# Patient Record
Sex: FEMALE | Race: WHITE | Employment: PART TIME | URBAN - METROPOLITAN AREA
[De-identification: names, ages, dates, MRNs, and addresses within clinical notes are randomized per-mention and may not be internally consistent; named-entity substitution may affect disease eponyms.]

---

## 2023-04-24 ENCOUNTER — HOSPITAL ENCOUNTER (OUTPATIENT)
Dept: ULTRASOUND IMAGING | Age: 48
Discharge: HOME | End: 2023-04-27
Payer: COMMERCIAL

## 2023-04-24 DIAGNOSIS — N92.0 EXCESSIVE AND FREQUENT MENSTRUATION WITH REGULAR CYCLE: ICD-10-CM

## 2023-04-24 PROCEDURE — 76856 US EXAM PELVIC COMPLETE: CPT

## 2024-05-13 ENCOUNTER — OFFICE VISIT (OUTPATIENT)
Dept: OBGYN CLINIC | Age: 49
End: 2024-05-13
Payer: COMMERCIAL

## 2024-05-13 VITALS
SYSTOLIC BLOOD PRESSURE: 142 MMHG | DIASTOLIC BLOOD PRESSURE: 80 MMHG | WEIGHT: 197.8 LBS | HEIGHT: 62 IN | BODY MASS INDEX: 36.4 KG/M2

## 2024-05-13 DIAGNOSIS — Z01.419 WELL WOMAN EXAM: Primary | ICD-10-CM

## 2024-05-13 DIAGNOSIS — N92.4 EXCESSIVE BLEEDING IN PREMENOPAUSAL PERIOD: ICD-10-CM

## 2024-05-13 PROCEDURE — 99214 OFFICE O/P EST MOD 30 MIN: CPT | Performed by: OBSTETRICS & GYNECOLOGY

## 2024-05-13 PROCEDURE — 99396 PREV VISIT EST AGE 40-64: CPT | Performed by: OBSTETRICS & GYNECOLOGY

## 2024-05-13 RX ORDER — FERROUS SULFATE 300 MG/5ML
300 LIQUID (ML) ORAL DAILY
COMMUNITY

## 2024-05-13 RX ORDER — TRANEXAMIC ACID 650 MG/1
1300 TABLET ORAL 3 TIMES DAILY
Qty: 30 TABLET | Refills: 3 | Status: SHIPPED | OUTPATIENT
Start: 2024-05-13

## 2024-05-13 SDOH — ECONOMIC STABILITY: HOUSING INSECURITY
IN THE LAST 12 MONTHS, WAS THERE A TIME WHEN YOU DID NOT HAVE A STEADY PLACE TO SLEEP OR SLEPT IN A SHELTER (INCLUDING NOW)?: NO

## 2024-05-13 SDOH — ECONOMIC STABILITY: FOOD INSECURITY: WITHIN THE PAST 12 MONTHS, YOU WORRIED THAT YOUR FOOD WOULD RUN OUT BEFORE YOU GOT MONEY TO BUY MORE.: NEVER TRUE

## 2024-05-13 SDOH — ECONOMIC STABILITY: FOOD INSECURITY: WITHIN THE PAST 12 MONTHS, THE FOOD YOU BOUGHT JUST DIDN'T LAST AND YOU DIDN'T HAVE MONEY TO GET MORE.: NEVER TRUE

## 2024-05-13 SDOH — ECONOMIC STABILITY: INCOME INSECURITY: HOW HARD IS IT FOR YOU TO PAY FOR THE VERY BASICS LIKE FOOD, HOUSING, MEDICAL CARE, AND HEATING?: PATIENT DECLINED

## 2024-05-13 ASSESSMENT — PATIENT HEALTH QUESTIONNAIRE - PHQ9
SUM OF ALL RESPONSES TO PHQ QUESTIONS 1-9: 0
SUM OF ALL RESPONSES TO PHQ QUESTIONS 1-9: 0
SUM OF ALL RESPONSES TO PHQ9 QUESTIONS 1 & 2: 0
SUM OF ALL RESPONSES TO PHQ QUESTIONS 1-9: 0
1. LITTLE INTEREST OR PLEASURE IN DOING THINGS: NOT AT ALL
SUM OF ALL RESPONSES TO PHQ QUESTIONS 1-9: 0
2. FEELING DOWN, DEPRESSED OR HOPELESS: NOT AT ALL

## 2024-05-13 NOTE — PROGRESS NOTES
The patient is here for  problems including hypermen     HISTORY:      Patient's last menstrual period was 2024 (exact date).SEE BELOW      Current Outpatient Medications on File Prior to Visit   Medication Sig Dispense Refill    ferrous Sulfate 300 (60 Fe) MG/5ML solution Take 5 mLs by mouth daily       No current facility-administered medications on file prior to visit.   SEE LIST    ROS:      PHYSICAL EXAM:  Blood pressure (!) 142/80, height 1.562 m (5' 1.5\"), weight 89.7 kg (197 lb 12.8 oz), last menstrual period 2024.    The patient appears well, alert, oriented x 3, in no distress.  Lungs are clear. Heart RRR, no murmurs. Abdomen soft without tenderness, guarding, mass or organomegaly.  Pelvic: bg     ASSESSMENT:DIAGNOSIS DISCUSSED INCLUDING DIFFERENTIAL cs x4  reg cy last hgb 8\" no efra  pap 12m ago bg no hx abn pap  col   us 12m ago 12cm today ? 10weeks ? Terry p asking about ablation  offered all  Lysteda     PLAN:    No orders of the defined types were placed in this encounter.    Complex high risk decision making many questions answered history reviewed precharting done required 30 minute of time discussing a vaiety of problems  goals are set  follow up planned     Trav  is a 48 y.o.   who is here for an annual exam.      History  Past Medical History:   Diagnosis Date    Anemia     Menorrhagia     ON FILE  Past Surgical History:   Procedure Laterality Date     SECTION      x4    PRESENT IN FILE  Current Outpatient Medications on File Prior to Visit   Medication Sig Dispense Refill    ferrous Sulfate 300 (60 Fe) MG/5ML solution Take 5 mLs by mouth daily       No current facility-administered medications on file prior to visit.   SEE UPDATED LIST  No Known AllergiesSEE LIST  Social History     Tobacco Use    Smoking status: Never    Smokeless tobacco: Never   Substance Use Topics    Alcohol use: Not Currently      History reviewed. No pertinent family history.  OBGYN History:

## 2024-06-10 ENCOUNTER — OFFICE VISIT (OUTPATIENT)
Dept: OBGYN CLINIC | Age: 49
End: 2024-06-10
Payer: COMMERCIAL

## 2024-06-10 ENCOUNTER — PROCEDURE VISIT (OUTPATIENT)
Dept: OBGYN CLINIC | Age: 49
End: 2024-06-10
Payer: COMMERCIAL

## 2024-06-10 VITALS
SYSTOLIC BLOOD PRESSURE: 142 MMHG | HEIGHT: 62 IN | BODY MASS INDEX: 36.62 KG/M2 | WEIGHT: 199 LBS | DIASTOLIC BLOOD PRESSURE: 78 MMHG

## 2024-06-10 DIAGNOSIS — N92.4 EXCESSIVE BLEEDING IN PREMENOPAUSAL PERIOD: Primary | ICD-10-CM

## 2024-06-10 DIAGNOSIS — D64.9 HEMOGLOBIN LOW: ICD-10-CM

## 2024-06-10 DIAGNOSIS — N85.2 ENLARGED UTERUS: Primary | ICD-10-CM

## 2024-06-10 DIAGNOSIS — N83.202 CYSTS OF BOTH OVARIES: ICD-10-CM

## 2024-06-10 DIAGNOSIS — N83.201 CYSTS OF BOTH OVARIES: ICD-10-CM

## 2024-06-10 PROCEDURE — 76830 TRANSVAGINAL US NON-OB: CPT | Performed by: OBSTETRICS & GYNECOLOGY

## 2024-06-10 PROCEDURE — 99215 OFFICE O/P EST HI 40 MIN: CPT | Performed by: OBSTETRICS & GYNECOLOGY

## 2024-06-10 NOTE — PROGRESS NOTES
The patient is here for  problems including enlarged melyssa     HISTORY:      Patient's last menstrual period was 04/20/2024 (exact date).SEE BELOW      Current Outpatient Medications on File Prior to Visit   Medication Sig Dispense Refill    ferrous Sulfate 300 (60 Fe) MG/5ML solution Take 5 mLs by mouth daily      tranexamic acid (LYSTEDA) 650 MG TABS tablet Take 2 tablets by mouth 3 times daily 30 tablet 3     No current facility-administered medications on file prior to visit.   SEE LIST    ROS:      PHYSICAL EXAM:  Blood pressure (!) 142/78, height 1.562 m (5' 1.5\"), weight 90.3 kg (199 lb), last menstrual period 04/20/2024.    The patient appears well, alert, oriented x 3, in no distress.  Lungs are clear. Heart RRR, no murmurs. Abdomen soft without tenderness, guarding, mass or organomegaly.  Pelvic: bg     ASSESSMENT:DIAGNOSIS DISCUSSED INCLUDING DIFFERENTIAL   Last hgb 8  Lysteda/fe/  decl endo bx today see us ::180 decl ablation or other wants hyst when hgb better   PLAN:    No orders of the defined types were placed in this encounter.    Complex high risk decision making many questions answered history reviewed precharting done required 40 minute of time discussing a vaiety of problems  goals are set  follow up planned

## 2024-06-25 ENCOUNTER — OFFICE VISIT (OUTPATIENT)
Dept: OBGYN CLINIC | Age: 49
End: 2024-06-25
Payer: COMMERCIAL

## 2024-06-25 VITALS
BODY MASS INDEX: 35.26 KG/M2 | SYSTOLIC BLOOD PRESSURE: 142 MMHG | DIASTOLIC BLOOD PRESSURE: 78 MMHG | WEIGHT: 199 LBS | HEIGHT: 63 IN

## 2024-06-25 DIAGNOSIS — D50.8 OTHER IRON DEFICIENCY ANEMIA: Primary | ICD-10-CM

## 2024-06-25 PROCEDURE — 99214 OFFICE O/P EST MOD 30 MIN: CPT | Performed by: OBSTETRICS & GYNECOLOGY

## 2024-06-25 NOTE — PROGRESS NOTES
patient.  On this date I have spent 40 minutes reviewing previous notes, test results, and face to face with the patient discussing the diagnosis and importance of compliance with the treatment plan as well as documenting.         ASSESSMENT/PLAN:   48 y.o., , with HMB, anemia:   - Discussed R/B/A to surgery. Due to her hx of CS x 4 she is a increased risk of pelvic adhesions/bladder adhesions and bladder injury during hysterectomy.   - Patient would like to try cyclic Aygestin while trying IV iron. If this improves bleeding may defer hysterectomy  - Plan to bring back in 3 months to recheck. If no improvement plan for TLH/BS  - In the meantime will refer to hematology for IV iron to optimize incase surgery  - Start Aygestin x 30 days due to prolong bleed. Then cyclic to maintain regular, light cycle    Orders Placed This Encounter   Procedures    BS - Arcelia Hernandez MD, Hematology & Oncology, [place]     Referral Priority:   Routine     Referral Type:   Eval and Treat     Referral Reason:   Specialty Services Required     Referred to Provider:   Arcelia Hernandez MD     Requested Specialty:   Pediatric Hematology/Oncology     Number of Visits Requested:   1        Lorena Mcrae DO

## 2024-07-02 NOTE — PROGRESS NOTES
NEW PATIENT INTAKE    Referral Diagnosis: Other iron deficiency anemia    Referring Provider:  Lorena Mcrae DO    Primary Care Provider: Rona Rossi, APRN - NP    Family History of Cancer/ Hematology Disorders: None reported     Presenting Symptoms:  Menorrhagia     Chronological History of Pertinent Events:  Ms. Lundy is a 48-year-old white female referred for NOEL.     5/13/24: Annual GYN exam with Dr. Edwar Orosco - reporting menorrhagia with last HGB reportedly 8  -Taking 300 mg liquid iron daily  -Started on Lysteda     6/10/24: TVUS: Enlarged, heterogenous UT (tilts posteriorly). Endometrium = 0.0mm. Irregular and avascular. Small, multiple Nabothian cysts in the cx. RT OV- seen TA only. Simple cyst. 4.7 x 3.1 x 4.5 cm. LT OV- seen TA only. Simple cyst. 1.4 x 0.9 x 1.5cm. No pelvic FF (Epic/Imaging)    6/25/24: Surgery consult with Dr. Mcrae at [facility] for HMB, anemia   -Pt reports regular, heavy menses until last cycle which has been prolonged >20 days  -Side effects to hormonal treatment in the past using OCPs  -MD notes, “Patient would like to try cyclic Aygestin while trying IV iron. If this improves bleeding may defer hysterectomy. Plan to bring back in 3 months to recheck. If no improvement plan for TLH/BS. In the meantime will refer to hematology for IV iron to optimize incase surgery. Start Aygestin x 30 days due to prolong bleed. Then cyclic to maintain regular, light cycle”  -Referred to [facility]    Notes from Referring Provider: None    Presented at Tumor Board: N/A    Other Pertinent Information: None

## 2024-07-03 ENCOUNTER — OFFICE VISIT (OUTPATIENT)
Dept: ONCOLOGY | Age: 49
End: 2024-07-03
Payer: COMMERCIAL

## 2024-07-03 ENCOUNTER — HOSPITAL ENCOUNTER (OUTPATIENT)
Dept: LAB | Age: 49
Discharge: HOME | End: 2024-07-06
Payer: COMMERCIAL

## 2024-07-03 VITALS
HEART RATE: 72 BPM | TEMPERATURE: 98.5 F | DIASTOLIC BLOOD PRESSURE: 71 MMHG | WEIGHT: 201 LBS | OXYGEN SATURATION: 100 % | RESPIRATION RATE: 18 BRPM | SYSTOLIC BLOOD PRESSURE: 116 MMHG | HEIGHT: 62 IN | BODY MASS INDEX: 36.99 KG/M2

## 2024-07-03 DIAGNOSIS — D50.0 IRON DEFICIENCY ANEMIA DUE TO CHRONIC BLOOD LOSS: Primary | ICD-10-CM

## 2024-07-03 DIAGNOSIS — E55.9 VITAMIN D DEFICIENCY: ICD-10-CM

## 2024-07-03 DIAGNOSIS — D64.9 ANEMIA, UNSPECIFIED TYPE: ICD-10-CM

## 2024-07-03 LAB
25(OH)D3 SERPL-MCNC: 32.9 NG/ML (ref 30–100)
ABO + RH BLD: NORMAL
ALBUMIN SERPL-MCNC: 3.8 G/DL (ref 3.5–5)
ALBUMIN/GLOB SERPL: 1.3 (ref 1–1.9)
ALP SERPL-CCNC: 56 U/L (ref 35–104)
ALT SERPL-CCNC: 16 U/L (ref 12–65)
ANION GAP SERPL CALC-SCNC: 10 MMOL/L (ref 9–18)
AST SERPL-CCNC: 19 U/L (ref 15–37)
BASOPHILS # BLD: 0 K/UL (ref 0–0.2)
BASOPHILS NFR BLD: 0 % (ref 0–2)
BILIRUB SERPL-MCNC: <0.2 MG/DL (ref 0–1.2)
BLOOD GROUP ANTIBODIES SERPL: NORMAL
BUN SERPL-MCNC: 8 MG/DL (ref 6–23)
CALCIUM SERPL-MCNC: 9.4 MG/DL (ref 8.8–10.2)
CHLORIDE SERPL-SCNC: 107 MMOL/L (ref 98–107)
CO2 SERPL-SCNC: 26 MMOL/L (ref 20–28)
CREAT SERPL-MCNC: 0.62 MG/DL (ref 0.6–1.1)
DIFFERENTIAL METHOD BLD: ABNORMAL
EOSINOPHIL # BLD: 0.1 K/UL (ref 0–0.8)
EOSINOPHIL NFR BLD: 1 % (ref 0.5–7.8)
ERYTHROCYTE [DISTWIDTH] IN BLOOD BY AUTOMATED COUNT: 20.4 % (ref 11.9–14.6)
FERRITIN SERPL-MCNC: 16 NG/ML (ref 8–388)
FOLATE SERPL-MCNC: 32.3 NG/ML (ref 3.1–17.5)
GLOBULIN SER CALC-MCNC: 3 G/DL (ref 2.3–3.5)
GLUCOSE SERPL-MCNC: 83 MG/DL (ref 70–99)
HCT VFR BLD AUTO: 33.8 % (ref 35.8–46.3)
HGB BLD-MCNC: 9.9 G/DL (ref 11.7–15.4)
IMM GRANULOCYTES # BLD AUTO: 0 K/UL (ref 0–0.5)
IMM GRANULOCYTES NFR BLD AUTO: 0 % (ref 0–5)
IRON SATN MFR SERPL: 6 % (ref 20–50)
IRON SERPL-MCNC: 23 UG/DL (ref 35–100)
LYMPHOCYTES # BLD: 1.3 K/UL (ref 0.5–4.6)
LYMPHOCYTES NFR BLD: 15 % (ref 13–44)
MCH RBC QN AUTO: 22 PG (ref 26.1–32.9)
MCHC RBC AUTO-ENTMCNC: 29.3 G/DL (ref 31.4–35)
MCV RBC AUTO: 74.9 FL (ref 82–102)
MONOCYTES # BLD: 0.4 K/UL (ref 0.1–1.3)
MONOCYTES NFR BLD: 4 % (ref 4–12)
NEUTS SEG # BLD: 7 K/UL (ref 1.7–8.2)
NEUTS SEG NFR BLD: 80 % (ref 43–78)
NRBC # BLD: 0 K/UL (ref 0–0.2)
PLATELET # BLD AUTO: 249 K/UL (ref 150–450)
PMV BLD AUTO: 9 FL (ref 9.4–12.3)
POTASSIUM SERPL-SCNC: 4.3 MMOL/L (ref 3.5–5.1)
PROT SERPL-MCNC: 6.8 G/DL (ref 6.3–8.2)
RBC # BLD AUTO: 4.51 M/UL (ref 4.05–5.2)
SODIUM SERPL-SCNC: 143 MMOL/L (ref 136–145)
SPECIMEN EXP DATE BLD: NORMAL
TIBC SERPL-MCNC: 378 UG/DL (ref 240–450)
TSH, 3RD GENERATION: 1.84 UIU/ML (ref 0.27–4.2)
UIBC SERPL-MCNC: 355 UG/DL (ref 112–347)
VIT B12 SERPL-MCNC: 491 PG/ML (ref 193–986)
WBC # BLD AUTO: 8.8 K/UL (ref 4.3–11.1)

## 2024-07-03 PROCEDURE — 82728 ASSAY OF FERRITIN: CPT

## 2024-07-03 PROCEDURE — 82607 VITAMIN B-12: CPT

## 2024-07-03 PROCEDURE — 99204 OFFICE O/P NEW MOD 45 MIN: CPT | Performed by: INTERNAL MEDICINE

## 2024-07-03 PROCEDURE — 84443 ASSAY THYROID STIM HORMONE: CPT

## 2024-07-03 PROCEDURE — 86901 BLOOD TYPING SEROLOGIC RH(D): CPT

## 2024-07-03 PROCEDURE — 86900 BLOOD TYPING SEROLOGIC ABO: CPT

## 2024-07-03 PROCEDURE — 85025 COMPLETE CBC W/AUTO DIFF WBC: CPT

## 2024-07-03 PROCEDURE — 82746 ASSAY OF FOLIC ACID SERUM: CPT

## 2024-07-03 PROCEDURE — 82180 ASSAY OF ASCORBIC ACID: CPT

## 2024-07-03 PROCEDURE — 36415 COLL VENOUS BLD VENIPUNCTURE: CPT

## 2024-07-03 PROCEDURE — 86850 RBC ANTIBODY SCREEN: CPT

## 2024-07-03 PROCEDURE — 83540 ASSAY OF IRON: CPT

## 2024-07-03 PROCEDURE — 80053 COMPREHEN METABOLIC PANEL: CPT

## 2024-07-03 PROCEDURE — 82306 VITAMIN D 25 HYDROXY: CPT

## 2024-07-03 PROCEDURE — 83550 IRON BINDING TEST: CPT

## 2024-07-03 ASSESSMENT — PATIENT HEALTH QUESTIONNAIRE - PHQ9
SUM OF ALL RESPONSES TO PHQ QUESTIONS 1-9: 0
2. FEELING DOWN, DEPRESSED OR HOPELESS: NOT AT ALL
SUM OF ALL RESPONSES TO PHQ QUESTIONS 1-9: 0
SUM OF ALL RESPONSES TO PHQ9 QUESTIONS 1 & 2: 0
1. LITTLE INTEREST OR PLEASURE IN DOING THINGS: NOT AT ALL

## 2024-07-03 NOTE — PROGRESS NOTES
[facility]: New Patient - Consultation    Chief Complaint   Patient presents with    New Patient     Anemia     Referral Diagnosis: Other iron deficiency anemia  Referring Provider:  Lorena Mcrae DO  Primary Care Provider: Rona Rossi, WALTER - NP  Family History of Cancer/ Hematology Disorders: None reported   Presenting Symptoms:  Menorrhagia     History of Present Illness:  Ms. Lundy is a 48 y.o. female who presents today in referral from Dr. Mcrae for consultation regarding anemia.  The past medical history is below.      Today, patient is here for consultation regarding anemia related to chronic blood loss from menorrhagia.  She was referred by her GYN.  She has pica.  Significant fatigue also noted especially in the last few weeks.  With her last menses she bled for 20 days with passage of blood clots.  She was on Lysteda but is not currently taking it.  She was also prescribed progestin but has not required to take it since she stopped bleeding for about a week now.  Will proceed with further workup to include CBC, smear and nutritional labs as well as a type and screen in case she needs a transfusion.  IV iron options reviewed with patient and will ideally start with Injectafer x2.  If she has any other needs she will be notified.  She will follow-up with me in 3 months or sooner if needed depending on her symptoms.  Due to chronic fatigue we will also check her thyroid studies.    Chronological Events:  5/13/24: Annual GYN exam with Dr. Edwar Orosco - reporting menorrhagia with last HGB reportedly 8  -Taking 300 mg liquid iron daily; Started on Lysteda   6/10/24: TVUS: Enlarged, heterogenous UT (tilts posteriorly). Endometrium = 0.0mm. Irregular and avascular. Small, multiple Nabothian cysts in the cx. RT OV- seen TA only. Simple cyst. 4.7 x 3.1 x 4.5 cm. LT OV- seen TA only. Simple cyst. 1.4 x 0.9 x 1.5cm. No pelvic FF (Epic/Imaging)  6/25/24: Surgery consult with

## 2024-07-03 NOTE — PATIENT INSTRUCTIONS
Patient Information from Today's Visit    Diagnosis: Anemia      Follow Up Instructions: About 3 months.    History reviewed.  Symptoms reviewed.  We will do blood work today.  List of high iron foods provided.    Treatment Summary has been discussed and given to patient: N/A      Current Labs: N/A      Please refer to After Visit Summary or MyChart for upcoming appointment information. If you have any questions regarding your upcoming schedule please reach out to your care team through [employer] or call [phone].    Please notify your assigned Nurse Navigator of any unplanned hospital admissions or Emergency Room visits within 24 hours of discharge.    -------------------------------------------------------------------------------------------------------------------  Please call our office at [phone] if you have any  of the following symptoms:   Fever of 100.5 or greater  Chills  Shortness of breath  Swelling or pain in one leg    After office hours an answering service is available and will contact a provider for emergencies or if you are experiencing any of the above symptoms.        LILI ORDRIGUEZ RN

## 2024-07-07 PROBLEM — E55.9 VITAMIN D DEFICIENCY: Status: ACTIVE | Noted: 2024-07-07

## 2024-07-08 LAB — PATH REV BLD -IMP: NORMAL

## 2024-07-09 LAB — VIT C SERPL-MCNC: 0.8 MG/DL (ref 0.4–2)

## 2024-07-19 ENCOUNTER — HOSPITAL ENCOUNTER (OUTPATIENT)
Dept: INFUSION THERAPY | Age: 49
Setting detail: INFUSION SERIES
Discharge: HOME | End: 2024-07-19
Payer: COMMERCIAL

## 2024-07-19 VITALS
HEART RATE: 62 BPM | DIASTOLIC BLOOD PRESSURE: 78 MMHG | RESPIRATION RATE: 18 BRPM | OXYGEN SATURATION: 100 % | SYSTOLIC BLOOD PRESSURE: 133 MMHG | TEMPERATURE: 98.4 F

## 2024-07-19 DIAGNOSIS — D64.9 ANEMIA, UNSPECIFIED TYPE: Primary | ICD-10-CM

## 2024-07-19 PROCEDURE — 2580000003 HC RX 258: Performed by: INTERNAL MEDICINE

## 2024-07-19 PROCEDURE — 6360000002 HC RX W HCPCS: Performed by: INTERNAL MEDICINE

## 2024-07-19 PROCEDURE — 96365 THER/PROPH/DIAG IV INF INIT: CPT

## 2024-07-19 RX ORDER — ACETAMINOPHEN 325 MG/1
650 TABLET ORAL
Status: CANCELLED | OUTPATIENT
Start: 2024-07-26

## 2024-07-19 RX ORDER — SODIUM CHLORIDE 9 MG/ML
5-250 INJECTION, SOLUTION INTRAVENOUS PRN
Status: CANCELLED | OUTPATIENT
Start: 2024-07-19

## 2024-07-19 RX ORDER — ONDANSETRON 2 MG/ML
8 INJECTION INTRAMUSCULAR; INTRAVENOUS
Status: DISCONTINUED | OUTPATIENT
Start: 2024-07-19 | End: 2024-07-20 | Stop reason: HOSPADM

## 2024-07-19 RX ORDER — SODIUM CHLORIDE 9 MG/ML
5-250 INJECTION, SOLUTION INTRAVENOUS PRN
Status: CANCELLED | OUTPATIENT
Start: 2024-07-26

## 2024-07-19 RX ORDER — SODIUM CHLORIDE 9 MG/ML
INJECTION, SOLUTION INTRAVENOUS CONTINUOUS
Status: DISCONTINUED | OUTPATIENT
Start: 2024-07-19 | End: 2024-07-20 | Stop reason: HOSPADM

## 2024-07-19 RX ORDER — DIPHENHYDRAMINE HYDROCHLORIDE 50 MG/ML
50 INJECTION INTRAMUSCULAR; INTRAVENOUS
Status: DISCONTINUED | OUTPATIENT
Start: 2024-07-19 | End: 2024-07-20 | Stop reason: HOSPADM

## 2024-07-19 RX ORDER — SODIUM CHLORIDE 9 MG/ML
5-250 INJECTION, SOLUTION INTRAVENOUS PRN
Status: DISCONTINUED | OUTPATIENT
Start: 2024-07-19 | End: 2024-07-20 | Stop reason: HOSPADM

## 2024-07-19 RX ORDER — HEPARIN 100 UNIT/ML
500 SYRINGE INTRAVENOUS PRN
Status: CANCELLED | OUTPATIENT
Start: 2024-07-26

## 2024-07-19 RX ORDER — ALBUTEROL SULFATE 90 UG/1
4 AEROSOL, METERED RESPIRATORY (INHALATION) PRN
Status: DISCONTINUED | OUTPATIENT
Start: 2024-07-19 | End: 2024-07-20 | Stop reason: HOSPADM

## 2024-07-19 RX ORDER — ACETAMINOPHEN 325 MG/1
650 TABLET ORAL
Status: DISCONTINUED | OUTPATIENT
Start: 2024-07-19 | End: 2024-07-20 | Stop reason: HOSPADM

## 2024-07-19 RX ORDER — SODIUM CHLORIDE 9 MG/ML
INJECTION, SOLUTION INTRAVENOUS CONTINUOUS
Status: CANCELLED | OUTPATIENT
Start: 2024-07-26

## 2024-07-19 RX ORDER — SODIUM CHLORIDE 0.9 % (FLUSH) 0.9 %
5-40 SYRINGE (ML) INJECTION PRN
Status: CANCELLED | OUTPATIENT
Start: 2024-07-26

## 2024-07-19 RX ORDER — ONDANSETRON 2 MG/ML
8 INJECTION INTRAMUSCULAR; INTRAVENOUS
Status: CANCELLED | OUTPATIENT
Start: 2024-07-26

## 2024-07-19 RX ORDER — EPINEPHRINE 1 MG/ML
0.3 INJECTION, SOLUTION, CONCENTRATE INTRAVENOUS PRN
Status: CANCELLED | OUTPATIENT
Start: 2024-07-26

## 2024-07-19 RX ORDER — SODIUM CHLORIDE 0.9 % (FLUSH) 0.9 %
5-40 SYRINGE (ML) INJECTION PRN
Status: CANCELLED | OUTPATIENT
Start: 2024-07-19

## 2024-07-19 RX ORDER — DIPHENHYDRAMINE HYDROCHLORIDE 50 MG/ML
50 INJECTION INTRAMUSCULAR; INTRAVENOUS
Status: CANCELLED | OUTPATIENT
Start: 2024-07-26

## 2024-07-19 RX ORDER — ALBUTEROL SULFATE 90 UG/1
4 AEROSOL, METERED RESPIRATORY (INHALATION) PRN
Status: CANCELLED | OUTPATIENT
Start: 2024-07-26

## 2024-07-19 RX ORDER — HEPARIN 100 UNIT/ML
500 SYRINGE INTRAVENOUS PRN
Status: CANCELLED | OUTPATIENT
Start: 2024-07-19

## 2024-07-19 RX ORDER — EPINEPHRINE 1 MG/ML
0.3 INJECTION, SOLUTION, CONCENTRATE INTRAVENOUS PRN
Status: DISCONTINUED | OUTPATIENT
Start: 2024-07-19 | End: 2024-07-20 | Stop reason: HOSPADM

## 2024-07-19 RX ADMIN — FERRIC CARBOXYMALTOSE INJECTION 750 MG: 50 INJECTION, SOLUTION INTRAVENOUS at 16:04

## 2024-07-19 RX ADMIN — SODIUM CHLORIDE 100 ML/HR: 9 INJECTION, SOLUTION INTRAVENOUS at 15:51

## 2024-07-19 NOTE — PROGRESS NOTES
Arrived to the Infusion Center.  Assessment completed. Injectafer completed. Patient tolerated without problems. Patient stayed for 30 min observation post infusion with no problems noted  Any issues or concerns during appointment: None  Instructed to call Dr Nicolas with any side effects or concerns  Patient aware of next infusion appointment on 7/26/24(date) at 3 15 PM (time).  Discharged ambulatory

## 2024-07-26 ENCOUNTER — HOSPITAL ENCOUNTER (OUTPATIENT)
Dept: INFUSION THERAPY | Age: 49
Setting detail: INFUSION SERIES
Discharge: HOME | End: 2024-07-26
Payer: COMMERCIAL

## 2024-07-26 VITALS
OXYGEN SATURATION: 100 % | TEMPERATURE: 98.4 F | HEART RATE: 67 BPM | DIASTOLIC BLOOD PRESSURE: 81 MMHG | RESPIRATION RATE: 16 BRPM | SYSTOLIC BLOOD PRESSURE: 124 MMHG

## 2024-07-26 DIAGNOSIS — D64.9 ANEMIA, UNSPECIFIED TYPE: Primary | ICD-10-CM

## 2024-07-26 PROCEDURE — 96365 THER/PROPH/DIAG IV INF INIT: CPT

## 2024-07-26 PROCEDURE — 2580000003 HC RX 258: Performed by: INTERNAL MEDICINE

## 2024-07-26 PROCEDURE — 6360000002 HC RX W HCPCS: Performed by: INTERNAL MEDICINE

## 2024-07-26 RX ORDER — EPINEPHRINE 1 MG/ML
0.3 INJECTION, SOLUTION, CONCENTRATE INTRAVENOUS PRN
OUTPATIENT
Start: 2024-07-26

## 2024-07-26 RX ORDER — SODIUM CHLORIDE 9 MG/ML
5-250 INJECTION, SOLUTION INTRAVENOUS PRN
Status: CANCELLED | OUTPATIENT
Start: 2024-07-26

## 2024-07-26 RX ORDER — ONDANSETRON 2 MG/ML
8 INJECTION INTRAMUSCULAR; INTRAVENOUS
OUTPATIENT
Start: 2024-07-26

## 2024-07-26 RX ORDER — SODIUM CHLORIDE 9 MG/ML
INJECTION, SOLUTION INTRAVENOUS CONTINUOUS
OUTPATIENT
Start: 2024-07-26

## 2024-07-26 RX ORDER — DIPHENHYDRAMINE HYDROCHLORIDE 50 MG/ML
50 INJECTION INTRAMUSCULAR; INTRAVENOUS
OUTPATIENT
Start: 2024-07-26

## 2024-07-26 RX ORDER — ALBUTEROL SULFATE 90 UG/1
4 AEROSOL, METERED RESPIRATORY (INHALATION) PRN
OUTPATIENT
Start: 2024-07-26

## 2024-07-26 RX ORDER — ACETAMINOPHEN 325 MG/1
650 TABLET ORAL
OUTPATIENT
Start: 2024-07-26

## 2024-07-26 RX ORDER — SODIUM CHLORIDE 9 MG/ML
5-250 INJECTION, SOLUTION INTRAVENOUS PRN
Status: DISCONTINUED | OUTPATIENT
Start: 2024-07-26 | End: 2024-07-27 | Stop reason: HOSPADM

## 2024-07-26 RX ORDER — SODIUM CHLORIDE 9 MG/ML
5-250 INJECTION, SOLUTION INTRAVENOUS PRN
OUTPATIENT
Start: 2024-07-26

## 2024-07-26 RX ORDER — SODIUM CHLORIDE 0.9 % (FLUSH) 0.9 %
5-40 SYRINGE (ML) INJECTION PRN
OUTPATIENT
Start: 2024-07-26

## 2024-07-26 RX ORDER — HEPARIN 100 UNIT/ML
500 SYRINGE INTRAVENOUS PRN
OUTPATIENT
Start: 2024-07-26

## 2024-07-26 RX ORDER — SODIUM CHLORIDE 0.9 % (FLUSH) 0.9 %
5-40 SYRINGE (ML) INJECTION PRN
Status: DISCONTINUED | OUTPATIENT
Start: 2024-07-26 | End: 2024-07-27 | Stop reason: HOSPADM

## 2024-07-26 RX ADMIN — SODIUM CHLORIDE 50 ML/HR: 9 INJECTION, SOLUTION INTRAVENOUS at 16:13

## 2024-07-26 RX ADMIN — SODIUM CHLORIDE, PRESERVATIVE FREE 10 ML: 5 INJECTION INTRAVENOUS at 16:05

## 2024-07-26 RX ADMIN — FERRIC CARBOXYMALTOSE INJECTION 750 MG: 50 INJECTION, SOLUTION INTRAVENOUS at 16:15

## 2024-07-26 NOTE — PROGRESS NOTES
Arrived to the Infusion Center.  Injectafer infusion completed. Patient tolerated well.   Any issues or concerns during appointment: no.  Patient aware of next lab and [facility] office visit on 11/8/24 (date) at 2:00pm (time).  Patient instructed to call provider with temperature of 100.4 or greater or nausea/vomiting/ diarrhea or pain not controlled by medications  Discharged ambulatory.

## 2024-08-09 ENCOUNTER — PREP FOR PROCEDURE (OUTPATIENT)
Dept: OBGYN CLINIC | Age: 49
End: 2024-08-09

## 2024-08-09 PROBLEM — N92.0 HEAVY PERIODS: Status: ACTIVE | Noted: 2024-08-09

## 2024-08-19 ENCOUNTER — OFFICE VISIT (OUTPATIENT)
Dept: OBGYN CLINIC | Age: 49
End: 2024-08-19
Payer: COMMERCIAL

## 2024-08-19 VITALS
WEIGHT: 201 LBS | BODY MASS INDEX: 36.99 KG/M2 | DIASTOLIC BLOOD PRESSURE: 82 MMHG | SYSTOLIC BLOOD PRESSURE: 124 MMHG | HEIGHT: 62 IN

## 2024-08-19 DIAGNOSIS — N93.9 ABNORMAL UTERINE BLEEDING (AUB): Primary | ICD-10-CM

## 2024-08-19 DIAGNOSIS — D50.0 IRON DEFICIENCY ANEMIA DUE TO CHRONIC BLOOD LOSS: ICD-10-CM

## 2024-08-19 PROCEDURE — 99213 OFFICE O/P EST LOW 20 MIN: CPT | Performed by: OBSTETRICS & GYNECOLOGY

## 2024-08-19 RX ORDER — SODIUM CHLORIDE 0.9 % (FLUSH) 0.9 %
5-40 SYRINGE (ML) INJECTION PRN
Status: CANCELLED | OUTPATIENT
Start: 2024-08-19

## 2024-08-19 RX ORDER — SODIUM CHLORIDE 9 MG/ML
INJECTION, SOLUTION INTRAVENOUS PRN
Status: CANCELLED | OUTPATIENT
Start: 2024-08-19

## 2024-08-19 RX ORDER — SODIUM CHLORIDE 0.9 % (FLUSH) 0.9 %
5-40 SYRINGE (ML) INJECTION EVERY 12 HOURS SCHEDULED
Status: CANCELLED | OUTPATIENT
Start: 2024-08-19

## 2024-08-19 NOTE — H&P
Gynecology Major Surgery History and Physical    Trav Lundy  [id number]    Subjective:      Chief complaint:  HMB, anemia     Trav is a 48 y.o. with HMB, anemia here for preop    History of HMB, anemia.      Menses: Regular, heavy until last cycle which has been prolonged > 20 days.     Prior work up:    cc uterus, ovaries with small < 2 cm simple cyst  Last pap within 1 year at outside group and normal. Will send results  Last Hg 8. On PO iron > has since seen hematology for IV iron infusions with improving Hg  Therapies tried: Side effects to hormonal treatment in the past using OCPs, I started her on Aygestin and AUB continued     Hx of CS x 4    Planned for IV iron and then TLH, but patient messaged in and would like to try endometrial ablation first    Past Medical History:   Diagnosis Date    Anemia     Iron infusions on 2024 and 2024    Menorrhagia      Past Surgical History:   Procedure Laterality Date     SECTION      x4    WISDOM TOOTH EXTRACTION       OB History          4    Para   4    Term   4       0    AB   0    Living   4         SAB   0    IAB   0    Ectopic   0    Molar   0    Multiple   0    Live Births   4                No Known Allergies    Cannot display prior to admission medications because the patient has not been admitted in this contact.       Family History   Problem Relation Age of Onset    Diabetes Father     Heart Surgery Father     Hypertension Father     Stroke Father      Social History     Socioeconomic History    Marital status:      Spouse name: Not on file    Number of children: Not on file    Years of education: Not on file    Highest education level: Not on file   Occupational History    Not on file   Tobacco Use    Smoking status: Never    Smokeless tobacco: Never   Vaping Use    Vaping status: Never Used   Substance and Sexual Activity    Alcohol use: Never    Drug use: Never    Sexual activity: Yes     Partners: Male

## 2024-08-20 ENCOUNTER — ANESTHESIA EVENT (OUTPATIENT)
Dept: SURGERY | Age: 49
End: 2024-08-20
Payer: COMMERCIAL

## 2024-08-20 RX ORDER — MIDAZOLAM HYDROCHLORIDE 2 MG/2ML
2 INJECTION, SOLUTION INTRAMUSCULAR; INTRAVENOUS
Status: CANCELLED | OUTPATIENT
Start: 2024-08-20 | End: 2024-08-21

## 2024-08-20 RX ORDER — FENTANYL CITRATE 50 UG/ML
100 INJECTION, SOLUTION INTRAMUSCULAR; INTRAVENOUS
Status: CANCELLED | OUTPATIENT
Start: 2024-08-20 | End: 2024-08-21

## 2024-08-21 ENCOUNTER — HOSPITAL ENCOUNTER (OUTPATIENT)
Age: 49
Setting detail: OUTPATIENT SURGERY
Discharge: HOME | End: 2024-08-21
Attending: OBSTETRICS & GYNECOLOGY | Admitting: OBSTETRICS & GYNECOLOGY
Payer: COMMERCIAL

## 2024-08-21 ENCOUNTER — ANESTHESIA (OUTPATIENT)
Dept: SURGERY | Age: 49
End: 2024-08-21
Payer: COMMERCIAL

## 2024-08-21 VITALS
HEIGHT: 62 IN | OXYGEN SATURATION: 100 % | RESPIRATION RATE: 16 BRPM | DIASTOLIC BLOOD PRESSURE: 75 MMHG | BODY MASS INDEX: 36.99 KG/M2 | WEIGHT: 201 LBS | TEMPERATURE: 97.3 F | SYSTOLIC BLOOD PRESSURE: 122 MMHG | HEART RATE: 62 BPM

## 2024-08-21 DIAGNOSIS — Z01.818 PRE-OP TESTING: Primary | ICD-10-CM

## 2024-08-21 LAB
HCG UR QL: NEGATIVE
HGB BLD-MCNC: 12.9 G/DL (ref 11.7–15.4)

## 2024-08-21 PROCEDURE — 2720000010 HC SURG SUPPLY STERILE: Performed by: OBSTETRICS & GYNECOLOGY

## 2024-08-21 PROCEDURE — 3600000014 HC SURGERY LEVEL 4 ADDTL 15MIN: Performed by: OBSTETRICS & GYNECOLOGY

## 2024-08-21 PROCEDURE — 81025 URINE PREGNANCY TEST: CPT

## 2024-08-21 PROCEDURE — 6360000002 HC RX W HCPCS: Performed by: NURSE ANESTHETIST, CERTIFIED REGISTERED

## 2024-08-21 PROCEDURE — 3600000004 HC SURGERY LEVEL 4 BASE: Performed by: OBSTETRICS & GYNECOLOGY

## 2024-08-21 PROCEDURE — 7100000010 HC PHASE II RECOVERY - FIRST 15 MIN: Performed by: OBSTETRICS & GYNECOLOGY

## 2024-08-21 PROCEDURE — 7100000001 HC PACU RECOVERY - ADDTL 15 MIN: Performed by: OBSTETRICS & GYNECOLOGY

## 2024-08-21 PROCEDURE — 88305 TISSUE EXAM BY PATHOLOGIST: CPT

## 2024-08-21 PROCEDURE — 2709999900 HC NON-CHARGEABLE SUPPLY: Performed by: OBSTETRICS & GYNECOLOGY

## 2024-08-21 PROCEDURE — 2500000003 HC RX 250 WO HCPCS: Performed by: NURSE ANESTHETIST, CERTIFIED REGISTERED

## 2024-08-21 PROCEDURE — 85018 HEMOGLOBIN: CPT

## 2024-08-21 PROCEDURE — 3700000000 HC ANESTHESIA ATTENDED CARE: Performed by: OBSTETRICS & GYNECOLOGY

## 2024-08-21 PROCEDURE — 3700000001 HC ADD 15 MINUTES (ANESTHESIA): Performed by: OBSTETRICS & GYNECOLOGY

## 2024-08-21 PROCEDURE — 7100000000 HC PACU RECOVERY - FIRST 15 MIN: Performed by: OBSTETRICS & GYNECOLOGY

## 2024-08-21 PROCEDURE — 7100000011 HC PHASE II RECOVERY - ADDTL 15 MIN: Performed by: OBSTETRICS & GYNECOLOGY

## 2024-08-21 PROCEDURE — 2580000003 HC RX 258: Performed by: STUDENT IN AN ORGANIZED HEALTH CARE EDUCATION/TRAINING PROGRAM

## 2024-08-21 RX ORDER — ONDANSETRON 2 MG/ML
INJECTION INTRAMUSCULAR; INTRAVENOUS PRN
Status: DISCONTINUED | OUTPATIENT
Start: 2024-08-21 | End: 2024-08-21 | Stop reason: SDUPTHER

## 2024-08-21 RX ORDER — LIDOCAINE HYDROCHLORIDE 20 MG/ML
INJECTION, SOLUTION EPIDURAL; INFILTRATION; INTRACAUDAL; PERINEURAL PRN
Status: DISCONTINUED | OUTPATIENT
Start: 2024-08-21 | End: 2024-08-21 | Stop reason: SDUPTHER

## 2024-08-21 RX ORDER — LIDOCAINE HYDROCHLORIDE 10 MG/ML
1 INJECTION, SOLUTION INFILTRATION; PERINEURAL
Status: DISCONTINUED | OUTPATIENT
Start: 2024-08-21 | End: 2024-08-21 | Stop reason: HOSPADM

## 2024-08-21 RX ORDER — MIDAZOLAM HYDROCHLORIDE 1 MG/ML
INJECTION INTRAMUSCULAR; INTRAVENOUS PRN
Status: DISCONTINUED | OUTPATIENT
Start: 2024-08-21 | End: 2024-08-21 | Stop reason: SDUPTHER

## 2024-08-21 RX ORDER — NALOXONE HYDROCHLORIDE 0.4 MG/ML
INJECTION, SOLUTION INTRAMUSCULAR; INTRAVENOUS; SUBCUTANEOUS PRN
Status: DISCONTINUED | OUTPATIENT
Start: 2024-08-21 | End: 2024-08-21 | Stop reason: HOSPADM

## 2024-08-21 RX ORDER — DEXAMETHASONE SODIUM PHOSPHATE 10 MG/ML
INJECTION INTRAMUSCULAR; INTRAVENOUS PRN
Status: DISCONTINUED | OUTPATIENT
Start: 2024-08-21 | End: 2024-08-21 | Stop reason: SDUPTHER

## 2024-08-21 RX ORDER — PROCHLORPERAZINE EDISYLATE 5 MG/ML
5 INJECTION INTRAMUSCULAR; INTRAVENOUS
Status: DISCONTINUED | OUTPATIENT
Start: 2024-08-21 | End: 2024-08-21 | Stop reason: HOSPADM

## 2024-08-21 RX ORDER — PROPOFOL 10 MG/ML
INJECTION, EMULSION INTRAVENOUS PRN
Status: DISCONTINUED | OUTPATIENT
Start: 2024-08-21 | End: 2024-08-21 | Stop reason: SDUPTHER

## 2024-08-21 RX ORDER — SODIUM CHLORIDE 9 MG/ML
INJECTION, SOLUTION INTRAVENOUS PRN
Status: DISCONTINUED | OUTPATIENT
Start: 2024-08-21 | End: 2024-08-21 | Stop reason: HOSPADM

## 2024-08-21 RX ORDER — OXYCODONE HYDROCHLORIDE 5 MG/1
5 TABLET ORAL
Status: DISCONTINUED | OUTPATIENT
Start: 2024-08-21 | End: 2024-08-21 | Stop reason: HOSPADM

## 2024-08-21 RX ORDER — SODIUM CHLORIDE 0.9 % (FLUSH) 0.9 %
5-40 SYRINGE (ML) INJECTION EVERY 12 HOURS SCHEDULED
Status: DISCONTINUED | OUTPATIENT
Start: 2024-08-21 | End: 2024-08-21 | Stop reason: HOSPADM

## 2024-08-21 RX ORDER — KETOROLAC TROMETHAMINE 30 MG/ML
INJECTION, SOLUTION INTRAMUSCULAR; INTRAVENOUS PRN
Status: DISCONTINUED | OUTPATIENT
Start: 2024-08-21 | End: 2024-08-21 | Stop reason: SDUPTHER

## 2024-08-21 RX ORDER — SODIUM CHLORIDE, SODIUM LACTATE, POTASSIUM CHLORIDE, CALCIUM CHLORIDE 600; 310; 30; 20 MG/100ML; MG/100ML; MG/100ML; MG/100ML
INJECTION, SOLUTION INTRAVENOUS CONTINUOUS
Status: DISCONTINUED | OUTPATIENT
Start: 2024-08-21 | End: 2024-08-21 | Stop reason: HOSPADM

## 2024-08-21 RX ORDER — IBUPROFEN 800 MG/1
800 TABLET ORAL 2 TIMES DAILY PRN
Qty: 60 TABLET | Refills: 0 | Status: SHIPPED | OUTPATIENT
Start: 2024-08-21

## 2024-08-21 RX ORDER — ONDANSETRON 2 MG/ML
4 INJECTION INTRAMUSCULAR; INTRAVENOUS
Status: DISCONTINUED | OUTPATIENT
Start: 2024-08-21 | End: 2024-08-21 | Stop reason: HOSPADM

## 2024-08-21 RX ORDER — SODIUM CHLORIDE 0.9 % (FLUSH) 0.9 %
5-40 SYRINGE (ML) INJECTION PRN
Status: DISCONTINUED | OUTPATIENT
Start: 2024-08-21 | End: 2024-08-21 | Stop reason: HOSPADM

## 2024-08-21 RX ADMIN — SODIUM CHLORIDE, POTASSIUM CHLORIDE, SODIUM LACTATE AND CALCIUM CHLORIDE: 600; 310; 30; 20 INJECTION, SOLUTION INTRAVENOUS at 11:30

## 2024-08-21 RX ADMIN — ONDANSETRON 4 MG: 2 INJECTION INTRAMUSCULAR; INTRAVENOUS at 13:26

## 2024-08-21 RX ADMIN — PROPOFOL 200 MG: 10 INJECTION, EMULSION INTRAVENOUS at 13:21

## 2024-08-21 RX ADMIN — LIDOCAINE HYDROCHLORIDE 80 MG: 20 INJECTION, SOLUTION EPIDURAL; INFILTRATION; INTRACAUDAL; PERINEURAL at 13:21

## 2024-08-21 RX ADMIN — DEXAMETHASONE SODIUM PHOSPHATE 5 MG: 10 INJECTION INTRAMUSCULAR; INTRAVENOUS at 13:26

## 2024-08-21 RX ADMIN — MIDAZOLAM 2 MG: 1 INJECTION INTRAMUSCULAR; INTRAVENOUS at 13:02

## 2024-08-21 RX ADMIN — KETOROLAC TROMETHAMINE 30 MG: 30 INJECTION, SOLUTION INTRAMUSCULAR; INTRAVENOUS at 13:47

## 2024-08-21 ASSESSMENT — PAIN DESCRIPTION - ORIENTATION
ORIENTATION: LOWER
ORIENTATION: LOWER

## 2024-08-21 ASSESSMENT — PAIN DESCRIPTION - LOCATION
LOCATION: ABDOMEN
LOCATION: ABDOMEN

## 2024-08-21 ASSESSMENT — PAIN DESCRIPTION - DESCRIPTORS: DESCRIPTORS: CRAMPING

## 2024-08-21 ASSESSMENT — PAIN SCALES - GENERAL
PAINLEVEL_OUTOF10: 2

## 2024-08-21 ASSESSMENT — PAIN - FUNCTIONAL ASSESSMENT: PAIN_FUNCTIONAL_ASSESSMENT: 0-10

## 2024-08-21 NOTE — ANESTHESIA POSTPROCEDURE EVALUATION
Department of Anesthesiology  Postprocedure Note    Patient: Trav Lundy  MRN: [id number]  YOB: 1975  Date of evaluation: 8/21/2024    Procedure Summary       Date: 08/21/24 Room / Location: [facility] MAIN OR 03 / [facility] MAIN OR    Anesthesia Start: 1306 Anesthesia Stop: 1357    Procedure: HYSTEROSCOPY DILATATION CURETTAGE WITH NOVASURE ENDOMETRIAL ABLATION (Vagina ) Diagnosis:       Heavy periods      (Heavy periods [N92.0])    Surgeons: Lorena Mcrae DO Responsible Provider: Usman Aranda MD    Anesthesia Type: general ASA Status: 2            Anesthesia Type: No value filed.    Kurtis Phase I: Kurtis Score: 9    Kurtis Phase II: Kurtis Score: 10    Anesthesia Post Evaluation    Patient location during evaluation: PACU  Patient participation: complete - patient participated  Level of consciousness: awake and alert  Airway patency: patent  Nausea & Vomiting: no nausea and no vomiting  Cardiovascular status: hemodynamically stable  Respiratory status: acceptable, nonlabored ventilation and spontaneous ventilation  Hydration status: euvolemic  Comments: /75   Pulse 62   Temp 97.3 °F (36.3 °C) (Temporal)   Resp 16   Ht 1.575 m (5' 2.01\")   Wt 91.2 kg (201 lb)   LMP 07/28/2024   SpO2 100%   BMI 36.75 kg/m²     Multimodal analgesia pain management approach  Pain management: adequate and satisfactory to patient    No notable events documented.

## 2024-08-21 NOTE — ANESTHESIA POSTPROCEDURE EVALUATION
Department of Anesthesiology  Postprocedure Note    Patient: Trav Lundy  MRN: [id number]  YOB: 1975  Date of evaluation: 8/21/2024    Procedure Summary       Date: 08/21/24 Room / Location: [facility] 03 / [facility]    Anesthesia Start: 1306 Anesthesia Stop: 1357    Procedure: HYSTEROSCOPY DILATATION CURETTAGE WITH NOVASURE ENDOMETRIAL ABLATION (Vagina ) Diagnosis:       Heavy periods      (Heavy periods [N92.0])    Surgeons: Lorena Mcrae DO Responsible Provider: Usman Aranda MD    Anesthesia Type: general ASA Status: 2            Anesthesia Type: No value filed.    Kurtis Phase I: Kurtis Score: 5    Kurtis Phase II:      Anesthesia Post Evaluation    Patient location during evaluation: PACU  Patient participation: complete - patient participated  Level of consciousness: awake and alert  Airway patency: patent  Nausea & Vomiting: no nausea and no vomiting  Cardiovascular status: hemodynamically stable  Respiratory status: acceptable, nonlabored ventilation and spontaneous ventilation  Hydration status: euvolemic  Comments: /73   Pulse 64   Temp 97.4 °F (36.3 °C) (Temporal)   Resp 23   Ht 1.575 m (5' 2.01\")   Wt 91.2 kg (201 lb)   LMP 07/28/2024   SpO2 97%   BMI 36.75 kg/m²     Multimodal analgesia pain management approach  Pain management: adequate and satisfactory to patient    No notable events documented.

## 2024-08-21 NOTE — ANESTHESIA PRE PROCEDURE
Lorena Mcrae,            Allergies:  No Known Allergies    Problem List:    Patient Active Problem List   Diagnosis Code   • Anemia D64.9   • Vitamin D deficiency E55.9   • Heavy periods N92.0       Past Medical History:        Diagnosis Date   • Anemia     Iron infusions on 2024 and 2024   • Menorrhagia        Past Surgical History:        Procedure Laterality Date   •  SECTION      x4   • WISDOM TOOTH EXTRACTION         Social History:    Social History     Tobacco Use   • Smoking status: Never   • Smokeless tobacco: Never   Substance Use Topics   • Alcohol use: Never                                Counseling given: Not Answered      Vital Signs (Current):   Vitals:    24 0914 24 1109   BP:  (!) 148/88   Pulse:  72   Resp:  18   Temp:  97.7 °F (36.5 °C)   TempSrc:  Temporal   SpO2:  99%   Weight: 91.2 kg (201 lb) 91.2 kg (201 lb)   Height: 1.575 m (5' 2\") 1.575 m (5' 2.01\")                                              BP Readings from Last 3 Encounters:   24 (!) 148/88   24 124/82   24 124/81       NPO Status: Time of last liquid consumption:                         Time of last solid consumption:                         Date of last liquid consumption: 24                        Date of last solid food consumption: 24    BMI:   Wt Readings from Last 3 Encounters:   24 91.2 kg (201 lb)   24 91.2 kg (201 lb)   24 91.2 kg (201 lb)     Body mass index is 36.75 kg/m².    CBC:   Lab Results   Component Value Date/Time    WBC 8.8 2024 12:16 PM    RBC 4.51 2024 12:16 PM    HGB 12.9 2024 11:24 AM    HCT 33.8 2024 12:16 PM    MCV 74.9 2024 12:16 PM    RDW 20.4 2024 12:16 PM     2024 12:16 PM       CMP:   Lab Results   Component Value Date/Time     2024 12:16 PM    K 4.3 2024 12:16 PM     2024 12:16 PM    CO2 26 2024 12:16 PM    BUN 8 2024

## 2024-08-21 NOTE — OP NOTE
Nicolajuan Nikkie  1975      Preop Dx:   1. HMB    Postop Dx:   Same    Procedure:   1. Hysteroscopy D&C    2. Novasure endometrial ablation    Surgeon:  Clementina    Anesthesia:  LMA    EBL:  minimal mL          Complications:  None    Findings:  Normal appearing endometrial cavity. Uterus sounded to 11 cm. Cervix 7 cm. Cavity L 4 cm. Cavity width 4.5 cm. Ablation time: 2 minutes     Specimens: Endometrial curetting     Procedure:  Patient was taken to the operating room where general anesthesia was found to be adequate.  She was prepped and draped in the usual sterile fashion and placed in the lithotomy position in Mick boots.  Weighted speculum was placed in patient's vagina and anterior lip of cervix grasped with a single tooth tenaculum.  Uterus was sounded to 11 cm. Cervix was sequentially dilated.  Hysteroscope was introduced without difficulty and findings as above. Sharp curettage was performed. Hysteroscope removed.  NovaSure introduced and ablation performed without difficulty in usual fashion.  All instruments removed from the patient's vagina and hemostasis was assured throughout.  Patient tolerated procedure well.  Sponge, lap and needle counts correct x 3.    Disposition:  Pt to RR in stable condition      Lorenameena Mcrae DO   2:02 PM  08/21/24

## 2024-09-04 ENCOUNTER — OFFICE VISIT (OUTPATIENT)
Dept: OBGYN CLINIC | Age: 49
End: 2024-09-04

## 2024-09-04 VITALS
WEIGHT: 201 LBS | DIASTOLIC BLOOD PRESSURE: 76 MMHG | BODY MASS INDEX: 36.99 KG/M2 | SYSTOLIC BLOOD PRESSURE: 122 MMHG | HEIGHT: 62 IN

## 2024-09-04 DIAGNOSIS — Z09 POSTOP CHECK: Primary | ICD-10-CM

## 2024-09-04 PROCEDURE — 99024 POSTOP FOLLOW-UP VISIT: CPT | Performed by: OBSTETRICS & GYNECOLOGY

## 2024-09-04 NOTE — PROGRESS NOTES
CC:  Postop follow-up      HPI:  Trav presents for postop visit from [facility] D&C with Novasure endometrial ablation     Op findings: Normal cavity   Pathology:  DIAGNOSIS       A:  \" ENDOMETRIAL CURETTINGS\":         FRAGMENTS OF NONSECRETORY ENDOMETRIUM     Patient doing well postop without significant complaints. Voiding without difficulty.  Tolerating regular diet w/out nausea/vomiting; +flatus and bms.  Pain controlled with Ibuprofen only.  Mild watery discharge.  Ambulating well.  No issues today.         PE:  /76   Ht 1.575 m (5' 2\")   Wt 91.2 kg (201 lb)   LMP 07/28/2024   BMI 36.76 kg/m²       Constitutional: She appears well-developed and well-nourished. No distress.    HENT:    Head: Normocephalic and atraumatic.    Cardiovascular: RR  Pulmonary/Chest: Normal effort   Ext: No c/c/e    A/P:  Stable Post op condition    Can return to normal activity  Discussed expected bleeding s/p ablation   Call if issues     Lorena Mcrae,

## 2024-11-04 DIAGNOSIS — E55.9 VITAMIN D DEFICIENCY: ICD-10-CM

## 2024-11-04 DIAGNOSIS — D64.9 ANEMIA, UNSPECIFIED TYPE: Primary | ICD-10-CM

## 2024-11-04 DIAGNOSIS — D50.0 IRON DEFICIENCY ANEMIA DUE TO CHRONIC BLOOD LOSS: ICD-10-CM

## 2024-11-08 ENCOUNTER — CLINICAL DOCUMENTATION (OUTPATIENT)
Dept: ONCOLOGY | Age: 49
End: 2024-11-08

## 2025-04-22 ENCOUNTER — OFFICE VISIT (OUTPATIENT)
Dept: OBGYN CLINIC | Age: 50
End: 2025-04-22
Payer: COMMERCIAL

## 2025-04-22 VITALS
WEIGHT: 201 LBS | BODY MASS INDEX: 36.99 KG/M2 | SYSTOLIC BLOOD PRESSURE: 124 MMHG | DIASTOLIC BLOOD PRESSURE: 80 MMHG | HEIGHT: 62 IN

## 2025-04-22 DIAGNOSIS — N93.9 ABNORMAL UTERINE BLEEDING (AUB): Primary | ICD-10-CM

## 2025-04-22 DIAGNOSIS — Z98.890 HISTORY OF ENDOMETRIAL ABLATION: ICD-10-CM

## 2025-04-22 PROCEDURE — 99214 OFFICE O/P EST MOD 30 MIN: CPT | Performed by: OBSTETRICS & GYNECOLOGY

## 2025-04-22 RX ORDER — NORETHINDRONE 5 MG/1
5 TABLET ORAL DAILY
Qty: 30 TABLET | Refills: 3 | Status: SHIPPED | OUTPATIENT
Start: 2025-04-22

## 2025-04-22 SDOH — ECONOMIC STABILITY: FOOD INSECURITY: WITHIN THE PAST 12 MONTHS, THE FOOD YOU BOUGHT JUST DIDN'T LAST AND YOU DIDN'T HAVE MONEY TO GET MORE.: NEVER TRUE

## 2025-04-22 SDOH — ECONOMIC STABILITY: FOOD INSECURITY: WITHIN THE PAST 12 MONTHS, YOU WORRIED THAT YOUR FOOD WOULD RUN OUT BEFORE YOU GOT MONEY TO BUY MORE.: NEVER TRUE

## 2025-04-22 NOTE — PROGRESS NOTES
CC:   Chief Complaint   Patient presents with    Vaginal Bleeding     Bleeding s/p ablation         HPI:    Trav  is a 49 y.o. , , No LMP recorded. Patient has had an ablation.,  who is seen for 30 days of spotting. S/p ablation with me in August. Bleeding was minimal and then had a cycle in October. Now with daily light spotting and passed large clot a few days ago.     Hx of CS x 4   2024 180 cc uterus. Likely adenomyosis     2021  Procedure:     1. Hysteroscopy D&C                          2. Novasure endometrial ablation     Surgeon:  Clementina     Anesthesia:  LMA     EBL:  minimal mL                                    Complications:  None     Findings:  Normal appearing endometrial cavity. Uterus sounded to 11 cm. Cervix 7 cm. Cavity L 4 cm. Cavity width 4.5 cm. Ablation time: 2 minutes       GYN HISTORY:  As per HPI      Current Outpatient Medications on File Prior to Visit   Medication Sig Dispense Refill    ibuprofen (ADVIL;MOTRIN) 800 MG tablet Take 1 tablet by mouth 2 times daily as needed for Pain 60 tablet 0    Dong Quai, Marce sinensis, (DONG QUAI PO) Take by mouth      BLACK COHOSH PO Take by mouth      Probiotic Product (PRO-BIOTIC BLEND PO) Take by mouth      NONFORMULARY Ginger tumeric      ferrous Sulfate 300 (60 Fe) MG/5ML solution Take 5 mLs by mouth daily       No current facility-administered medications on file prior to visit.         Past Medical History:   Diagnosis Date    Anemia     Iron infusions on 2024 and 2024    Menorrhagia          Past Surgical History:   Procedure Laterality Date     SECTION      x4    DILATION AND CURETTAGE OF UTERUS N/A 2024    HYSTEROSCOPY DILATATION CURETTAGE WITH NOVASURE ENDOMETRIAL ABLATION performed by Lorena Mcrae DO at [facility] MAIN OR    WISDOM TOOTH EXTRACTION           Outpatient Encounter Medications as of 2025   Medication Sig Dispense Refill    norethindrone (AYGESTIN) 5 MG tablet Take 1 tablet by

## 2025-04-29 ENCOUNTER — PROCEDURE VISIT (OUTPATIENT)
Dept: OBGYN CLINIC | Age: 50
End: 2025-04-29
Payer: COMMERCIAL

## 2025-04-29 ENCOUNTER — OFFICE VISIT (OUTPATIENT)
Dept: OBGYN CLINIC | Age: 50
End: 2025-04-29
Payer: COMMERCIAL

## 2025-04-29 VITALS
BODY MASS INDEX: 37.73 KG/M2 | HEIGHT: 62 IN | SYSTOLIC BLOOD PRESSURE: 124 MMHG | WEIGHT: 205 LBS | DIASTOLIC BLOOD PRESSURE: 80 MMHG

## 2025-04-29 DIAGNOSIS — N83.201 CYSTS OF BOTH OVARIES: ICD-10-CM

## 2025-04-29 DIAGNOSIS — N85.2 ENLARGED UTERUS: ICD-10-CM

## 2025-04-29 DIAGNOSIS — N83.202 CYSTS OF BOTH OVARIES: ICD-10-CM

## 2025-04-29 DIAGNOSIS — N93.9 ABNORMAL UTERINE BLEEDING (AUB): Primary | ICD-10-CM

## 2025-04-29 DIAGNOSIS — Z98.890 HISTORY OF ENDOMETRIAL ABLATION: ICD-10-CM

## 2025-04-29 PROCEDURE — 99213 OFFICE O/P EST LOW 20 MIN: CPT | Performed by: OBSTETRICS & GYNECOLOGY

## 2025-04-29 PROCEDURE — 76830 TRANSVAGINAL US NON-OB: CPT | Performed by: OBSTETRICS & GYNECOLOGY

## 2025-04-29 NOTE — PROGRESS NOTES
Result visit:       Trav Lundy is her today to discuss US results which was performed for AUB following endometrial ablation.    History:   Patient has had an ablation.,  who is seen for 30 days of spotting. S/p ablation with me in August. Bleeding was minimal and then had a cycle in October. Now with daily light spotting and passed large clot a few days ago.      Hx of CS x 4   6/2024 180 cc uterus. Likely adenomyosis     US today   120 cc uterus, EMS 9mm. No fluid noted.   Small bilateral simple ovarian cysts     4/23 started on daily Aygestin but has not picked up yet. Bleeding initially stopped, but now back. Will      Vitals:    04/29/25 0846   BP: 124/80        Results/imaging:     A/P:   AUB  Hx of CS x 4    US images reviewed with patient. No hematometra which is reassuring. Likely some active lining remaining.   Options discussed. Patient is higher risk for surgery due to CS x 4. Will try the Aygestin. If no improvement of side effects plan for RA/TLH. Patient to message in if she desires hysterectomy.   R/B/A discussed.       Lorena Mcrae DO

## 2025-05-15 ENCOUNTER — PREP FOR PROCEDURE (OUTPATIENT)
Dept: OBGYN CLINIC | Age: 50
End: 2025-05-15

## 2025-05-15 DIAGNOSIS — N99.85 POST ENDOMETRIAL ABLATION SYNDROME: ICD-10-CM

## 2025-05-15 DIAGNOSIS — N93.9 ABNORMAL UTERINE BLEEDING (AUB): ICD-10-CM

## 2025-05-16 PROBLEM — N99.85 POST ENDOMETRIAL ABLATION SYNDROME: Status: ACTIVE | Noted: 2025-05-15

## 2025-05-16 PROBLEM — N93.9 ABNORMAL UTERINE BLEEDING (AUB): Status: ACTIVE | Noted: 2025-05-15

## 2025-05-22 RX ORDER — NORETHINDRONE 5 MG/1
5 TABLET ORAL 2 TIMES DAILY
Qty: 60 TABLET | Refills: 3 | Status: SHIPPED | OUTPATIENT
Start: 2025-05-22

## 2025-06-05 NOTE — PERIOP NOTE
Patient verified name and     Order for consent NOT found in EHR; patient verified.     Type 2 surgery    Labs per surgeon: No orders received.  Labs per anesthesia protocol: Hgb, T&S DOS; orders signed and held in EHR.   EKG: None per anesthesia protocol.     Patient informed of GYN class on 25 at which time labs will be drawn. Patient will also receive all patient education and if staying overnight will receive hospital approved surgical skin cleanser; if not, patient will use non-moisturizing soap.    Patient instructed to hold all vitamins 7 days prior to surgery and NSAIDS 5 days prior to surgery, patient verbalized understanding.    Patient instructed to continue previous medications as prescribed prior to surgery and to take the following medications the day of surgery according to anesthesia guidelines with a small sip of water: None.     Patient answered medical/surgical history questions at their best of ability. All prior to admission medications documented in [facility].

## 2025-06-05 NOTE — PERIOP NOTE
PLEASE CONTINUE TAKING ALL PRESCRIPTION MEDICATIONS UP TO THE DAY OF SURGERY UNLESS OTHERWISE DIRECTED BELOW.     DISCONTINUE all over the counter vitamins, supplements, and herbals 7 days prior to surgery. DISCONTINUE Non-Steroidal Anti-Inflammatory (NSAIDS) such as Advil, Ibuprofen, Motrin, Naproxen, and Aleve 5 days prior to surgery.      *IT IS OK TO TAKE TYLENOL, iron supplement, Allergy medication, and indigestion medications*     Prescription medications to HOLD     NONE          CONTINUE all prescriptions like normal up until the day of surgery--TAKE ONLY THE BELOW MEDICATIONS THE DAY OF SURGERY.    NONE          Comments   The day before surgery please take 2 Tylenol in the morning and then again before bed. You may use either regular or extra strength.           Please do not bring home medications with you on the day of surgery unless otherwise directed by your nurse.  If you are instructed to bring home medications, please give them to your nurse as they will be administered by the nursing staff.     If you have any questions, please call [facility] [phone].     Copy of above instructions will be given and reviewed with patient at GYN/ERAS class.

## 2025-06-10 ENCOUNTER — CLINICAL DOCUMENTATION (OUTPATIENT)
Dept: SURGERY | Age: 50
End: 2025-06-10

## 2025-06-10 NOTE — PROGRESS NOTES
Enhanced Recovery After GYN Surgery: non-diabetic patients    The night before surgery 6/29/25, drink 2 bottles of the Ensure Pre-Surgery drink.     The morning of surgery 6/30/25, drink one bottle of the Ensure Pre-Surgery 2 hours prior to hospital arrival time.  Drink this over 5-10 minutes.    Drink nothing else after drinking the pre-surgical drink the morning of surgery.    Bring your patient handbook with you to the hospital.    Things to remember:    1. You will be given clear liquids to drink, advancing diet as tolerated    2. You will be up and moving around with assistance 1-2 hours after surgery.    3. You will be given regularly scheduled pain medications (NSAIDS, Tylenol, ) with narcotics as needed.    4. You may be able to go home that night if the surgeon okays and you are up and eating and drinking. Otherwise, your discharge will be the following morning around lunch time.     5.  Drink  2 Ensure Surgery  drinks  a day for 5 days after surgery.

## 2025-06-11 ENCOUNTER — HOSPITAL ENCOUNTER (OUTPATIENT)
Dept: SURGERY | Age: 50
Discharge: HOME | End: 2025-06-14
Payer: COMMERCIAL

## 2025-06-11 DIAGNOSIS — Z01.818 PRE-OP TESTING: ICD-10-CM

## 2025-06-11 LAB — HGB BLD-MCNC: 12.3 G/DL (ref 11.7–15.4)

## 2025-06-11 PROCEDURE — 85018 HEMOGLOBIN: CPT

## 2025-06-11 PROCEDURE — 36415 COLL VENOUS BLD VENIPUNCTURE: CPT

## 2025-06-11 NOTE — PROGRESS NOTES
HGB  within anesthesia guidelines, no follow-up required. Labs automatically routed to ordering provider via Epic documentation.

## 2025-06-13 ENCOUNTER — TRANSCRIBE ORDERS (OUTPATIENT)
Dept: SCHEDULING | Age: 50
End: 2025-06-13

## 2025-06-13 DIAGNOSIS — Z12.31 ENCOUNTER FOR SCREENING MAMMOGRAM FOR MALIGNANT NEOPLASM OF BREAST: Primary | ICD-10-CM

## 2025-06-19 RX ORDER — SODIUM CHLORIDE 0.9 % (FLUSH) 0.9 %
5-40 SYRINGE (ML) INJECTION PRN
OUTPATIENT
Start: 2025-06-19

## 2025-06-19 RX ORDER — SODIUM CHLORIDE 9 MG/ML
INJECTION, SOLUTION INTRAVENOUS PRN
OUTPATIENT
Start: 2025-06-19

## 2025-06-19 RX ORDER — SODIUM CHLORIDE 0.9 % (FLUSH) 0.9 %
5-40 SYRINGE (ML) INJECTION EVERY 12 HOURS SCHEDULED
OUTPATIENT
Start: 2025-06-19

## 2025-06-19 NOTE — H&P
Gynecology Major Surgery History and Physical    Trav Lundy  [id number]    Subjective:      Chief complaint:  abnormal uterine bleeding    Trav is a 49 y.o. with AUB s/p failed endometrial ablation     History:   Patient has had an ablation.,  who is seen for 30 days of spotting. S/p ablation with me in August. Bleeding was minimal and then had a cycle in October. Now with daily light spotting and passed large clot a few days ago.      Hx of CS x 4   2024 180 cc uterus. Likely adenomyosis      US   120 cc uterus, EMS 9mm. No fluid noted.   Small bilateral simple ovarian cysts     Last pap  at outside office (reported normal from patient)     Past Medical History:   Diagnosis Date    Anemia     Iron infusions on 2024 and 2024; daily iron supplement    Menorrhagia      Past Surgical History:   Procedure Laterality Date     SECTION      x4    DILATION AND CURETTAGE OF UTERUS N/A 2024    HYSTEROSCOPY DILATATION CURETTAGE WITH NOVASURE ENDOMETRIAL ABLATION performed by Lorena Mcrae DO at [facility] [place] [facility] [place]    WISDOM TOOTH EXTRACTION       OB History          4    Para   4    Term   4       0    AB   0    Living   4         SAB   0    IAB   0    Ectopic   0    Molar   0    Multiple   0    Live Births   4                No Known Allergies    Cannot display prior to admission medications because the patient has not been admitted in this contact.       Family History   Problem Relation Age of Onset    Diabetes Father     Heart Surgery Father     Hypertension Father     Stroke Father      Social History     Socioeconomic History    Marital status:      Spouse name: Not on file    Number of children: Not on file    Years of education: Not on file    Highest education level: Not on file   Occupational History    Not on file   Tobacco Use    Smoking status: Never    Smokeless tobacco: Never   Vaping Use    Vaping status: Never Used   Substance and Sexual Activity

## 2025-06-20 ENCOUNTER — OFFICE VISIT (OUTPATIENT)
Dept: OBGYN CLINIC | Age: 50
End: 2025-06-20
Payer: COMMERCIAL

## 2025-06-20 VITALS
DIASTOLIC BLOOD PRESSURE: 80 MMHG | HEIGHT: 62 IN | BODY MASS INDEX: 37.73 KG/M2 | WEIGHT: 205 LBS | SYSTOLIC BLOOD PRESSURE: 124 MMHG

## 2025-06-20 DIAGNOSIS — N93.9 ABNORMAL UTERINE BLEEDING (AUB): Primary | ICD-10-CM

## 2025-06-20 PROCEDURE — 99213 OFFICE O/P EST LOW 20 MIN: CPT | Performed by: OBSTETRICS & GYNECOLOGY

## 2025-06-29 ENCOUNTER — ANESTHESIA EVENT (OUTPATIENT)
Dept: SURGERY | Age: 50
End: 2025-06-29
Payer: COMMERCIAL

## 2025-06-30 ENCOUNTER — ANESTHESIA (OUTPATIENT)
Dept: SURGERY | Age: 50
End: 2025-06-30
Payer: COMMERCIAL

## 2025-06-30 ENCOUNTER — HOSPITAL ENCOUNTER (OUTPATIENT)
Age: 50
Setting detail: OUTPATIENT SURGERY
Discharge: HOME | End: 2025-06-30
Attending: OBSTETRICS & GYNECOLOGY | Admitting: OBSTETRICS & GYNECOLOGY
Payer: COMMERCIAL

## 2025-06-30 VITALS
TEMPERATURE: 97.3 F | OXYGEN SATURATION: 98 % | HEART RATE: 75 BPM | DIASTOLIC BLOOD PRESSURE: 75 MMHG | RESPIRATION RATE: 15 BRPM | HEIGHT: 62 IN | SYSTOLIC BLOOD PRESSURE: 130 MMHG | WEIGHT: 209.5 LBS | BODY MASS INDEX: 38.55 KG/M2

## 2025-06-30 DIAGNOSIS — Z01.818 PRE-OP TESTING: Primary | ICD-10-CM

## 2025-06-30 DIAGNOSIS — N93.9 ABNORMAL UTERINE BLEEDING (AUB): ICD-10-CM

## 2025-06-30 LAB
ABO + RH BLD: NORMAL
BLOOD GROUP ANTIBODIES SERPL: NORMAL
HCG UR QL: NEGATIVE
SPECIMEN EXP DATE BLD: NORMAL

## 2025-06-30 PROCEDURE — 2580000003 HC RX 258: Performed by: ANESTHESIOLOGY

## 2025-06-30 PROCEDURE — 6370000000 HC RX 637 (ALT 250 FOR IP): Performed by: ANESTHESIOLOGY

## 2025-06-30 PROCEDURE — 88341 IMHCHEM/IMCYTCHM EA ADD ANTB: CPT

## 2025-06-30 PROCEDURE — 6360000002 HC RX W HCPCS: Performed by: OBSTETRICS & GYNECOLOGY

## 2025-06-30 PROCEDURE — 2580000003 HC RX 258: Performed by: NURSE ANESTHETIST, CERTIFIED REGISTERED

## 2025-06-30 PROCEDURE — 86900 BLOOD TYPING SEROLOGIC ABO: CPT

## 2025-06-30 PROCEDURE — 2709999900 HC NON-CHARGEABLE SUPPLY: Performed by: OBSTETRICS & GYNECOLOGY

## 2025-06-30 PROCEDURE — 7100000011 HC PHASE II RECOVERY - ADDTL 15 MIN: Performed by: OBSTETRICS & GYNECOLOGY

## 2025-06-30 PROCEDURE — 7100000010 HC PHASE II RECOVERY - FIRST 15 MIN: Performed by: OBSTETRICS & GYNECOLOGY

## 2025-06-30 PROCEDURE — 81025 URINE PREGNANCY TEST: CPT

## 2025-06-30 PROCEDURE — 7100000000 HC PACU RECOVERY - FIRST 15 MIN: Performed by: OBSTETRICS & GYNECOLOGY

## 2025-06-30 PROCEDURE — 6360000002 HC RX W HCPCS: Performed by: NURSE ANESTHETIST, CERTIFIED REGISTERED

## 2025-06-30 PROCEDURE — 86850 RBC ANTIBODY SCREEN: CPT

## 2025-06-30 PROCEDURE — 3700000001 HC ADD 15 MINUTES (ANESTHESIA): Performed by: OBSTETRICS & GYNECOLOGY

## 2025-06-30 PROCEDURE — 3600000009 HC SURGERY ROBOT BASE: Performed by: OBSTETRICS & GYNECOLOGY

## 2025-06-30 PROCEDURE — 6360000002 HC RX W HCPCS: Performed by: ANESTHESIOLOGY

## 2025-06-30 PROCEDURE — 3700000000 HC ANESTHESIA ATTENDED CARE: Performed by: OBSTETRICS & GYNECOLOGY

## 2025-06-30 PROCEDURE — S2900 ROBOTIC SURGICAL SYSTEM: HCPCS | Performed by: OBSTETRICS & GYNECOLOGY

## 2025-06-30 PROCEDURE — 88307 TISSUE EXAM BY PATHOLOGIST: CPT

## 2025-06-30 PROCEDURE — 3600000019 HC SURGERY ROBOT ADDTL 15MIN: Performed by: OBSTETRICS & GYNECOLOGY

## 2025-06-30 PROCEDURE — 2720000010 HC SURG SUPPLY STERILE: Performed by: OBSTETRICS & GYNECOLOGY

## 2025-06-30 PROCEDURE — 88342 IMHCHEM/IMCYTCHM 1ST ANTB: CPT

## 2025-06-30 PROCEDURE — 7100000001 HC PACU RECOVERY - ADDTL 15 MIN: Performed by: OBSTETRICS & GYNECOLOGY

## 2025-06-30 PROCEDURE — 86901 BLOOD TYPING SEROLOGIC RH(D): CPT

## 2025-06-30 PROCEDURE — 2500000003 HC RX 250 WO HCPCS: Performed by: NURSE ANESTHETIST, CERTIFIED REGISTERED

## 2025-06-30 RX ORDER — ACETAMINOPHEN 500 MG
1000 TABLET ORAL EVERY 8 HOURS SCHEDULED
Status: CANCELLED | OUTPATIENT
Start: 2025-06-30

## 2025-06-30 RX ORDER — ONDANSETRON 2 MG/ML
4 INJECTION INTRAMUSCULAR; INTRAVENOUS EVERY 6 HOURS PRN
Status: CANCELLED | OUTPATIENT
Start: 2025-06-30

## 2025-06-30 RX ORDER — CEFAZOLIN 1 G/1
INJECTION, POWDER, FOR SOLUTION INTRAVENOUS
Status: DISCONTINUED | OUTPATIENT
Start: 2025-06-30 | End: 2025-06-30 | Stop reason: SDUPTHER

## 2025-06-30 RX ORDER — SODIUM CHLORIDE 9 MG/ML
INJECTION, SOLUTION INTRAVENOUS PRN
Status: DISCONTINUED | OUTPATIENT
Start: 2025-06-30 | End: 2025-06-30 | Stop reason: HOSPADM

## 2025-06-30 RX ORDER — ROCURONIUM BROMIDE 10 MG/ML
INJECTION, SOLUTION INTRAVENOUS
Status: DISCONTINUED | OUTPATIENT
Start: 2025-06-30 | End: 2025-06-30 | Stop reason: SDUPTHER

## 2025-06-30 RX ORDER — PHENAZOPYRIDINE HYDROCHLORIDE 100 MG/1
100 TABLET, FILM COATED ORAL 2 TIMES DAILY
Qty: 30 TABLET | Refills: 0 | Status: ON HOLD | OUTPATIENT
Start: 2025-06-30 | End: 2026-06-30

## 2025-06-30 RX ORDER — HYDROMORPHONE HYDROCHLORIDE 2 MG/ML
INJECTION, SOLUTION INTRAMUSCULAR; INTRAVENOUS; SUBCUTANEOUS
Status: DISCONTINUED | OUTPATIENT
Start: 2025-06-30 | End: 2025-06-30 | Stop reason: SDUPTHER

## 2025-06-30 RX ORDER — GLYCOPYRROLATE 0.2 MG/ML
INJECTION INTRAMUSCULAR; INTRAVENOUS
Status: DISCONTINUED | OUTPATIENT
Start: 2025-06-30 | End: 2025-06-30 | Stop reason: SDUPTHER

## 2025-06-30 RX ORDER — SODIUM CHLORIDE 9 MG/ML
INJECTION, SOLUTION INTRAVENOUS PRN
Status: CANCELLED | OUTPATIENT
Start: 2025-06-30

## 2025-06-30 RX ORDER — KETOROLAC TROMETHAMINE 30 MG/ML
30 INJECTION, SOLUTION INTRAMUSCULAR; INTRAVENOUS EVERY 6 HOURS
Status: CANCELLED | OUTPATIENT
Start: 2025-06-30 | End: 2025-07-01

## 2025-06-30 RX ORDER — OXYCODONE HYDROCHLORIDE 5 MG/1
5 TABLET ORAL EVERY 4 HOURS PRN
Refills: 0 | Status: CANCELLED | OUTPATIENT
Start: 2025-06-30

## 2025-06-30 RX ORDER — SODIUM CHLORIDE 0.9 % (FLUSH) 0.9 %
5-40 SYRINGE (ML) INJECTION EVERY 12 HOURS SCHEDULED
Status: CANCELLED | OUTPATIENT
Start: 2025-06-30

## 2025-06-30 RX ORDER — ONDANSETRON 4 MG/1
4 TABLET, ORALLY DISINTEGRATING ORAL EVERY 8 HOURS PRN
Status: CANCELLED | OUTPATIENT
Start: 2025-06-30

## 2025-06-30 RX ORDER — LIDOCAINE HYDROCHLORIDE 20 MG/ML
INJECTION, SOLUTION EPIDURAL; INFILTRATION; INTRACAUDAL; PERINEURAL
Status: DISCONTINUED | OUTPATIENT
Start: 2025-06-30 | End: 2025-06-30 | Stop reason: SDUPTHER

## 2025-06-30 RX ORDER — MIDAZOLAM HYDROCHLORIDE 1 MG/ML
INJECTION, SOLUTION INTRAMUSCULAR; INTRAVENOUS
Status: DISCONTINUED | OUTPATIENT
Start: 2025-06-30 | End: 2025-06-30 | Stop reason: SDUPTHER

## 2025-06-30 RX ORDER — OXYCODONE HYDROCHLORIDE 5 MG/1
5 TABLET ORAL EVERY 6 HOURS PRN
Qty: 20 TABLET | Refills: 0 | Status: ON HOLD | OUTPATIENT
Start: 2025-06-30 | End: 2025-07-05

## 2025-06-30 RX ORDER — SODIUM CHLORIDE, SODIUM LACTATE, POTASSIUM CHLORIDE, CALCIUM CHLORIDE 600; 310; 30; 20 MG/100ML; MG/100ML; MG/100ML; MG/100ML
INJECTION, SOLUTION INTRAVENOUS
Status: DISCONTINUED | OUTPATIENT
Start: 2025-06-30 | End: 2025-06-30 | Stop reason: SDUPTHER

## 2025-06-30 RX ORDER — HYDROMORPHONE HYDROCHLORIDE 1 MG/ML
0.5 INJECTION, SOLUTION INTRAMUSCULAR; INTRAVENOUS; SUBCUTANEOUS
Refills: 0 | Status: CANCELLED | OUTPATIENT
Start: 2025-06-30

## 2025-06-30 RX ORDER — SODIUM CHLORIDE, SODIUM LACTATE, POTASSIUM CHLORIDE, CALCIUM CHLORIDE 600; 310; 30; 20 MG/100ML; MG/100ML; MG/100ML; MG/100ML
INJECTION, SOLUTION INTRAVENOUS CONTINUOUS
Status: CANCELLED | OUTPATIENT
Start: 2025-06-30

## 2025-06-30 RX ORDER — SODIUM CHLORIDE 0.9 % (FLUSH) 0.9 %
5-40 SYRINGE (ML) INJECTION EVERY 12 HOURS SCHEDULED
Status: DISCONTINUED | OUTPATIENT
Start: 2025-06-30 | End: 2025-06-30 | Stop reason: HOSPADM

## 2025-06-30 RX ORDER — SIMETHICONE 80 MG
80 TABLET,CHEWABLE ORAL EVERY 6 HOURS PRN
Status: CANCELLED | OUTPATIENT
Start: 2025-06-30

## 2025-06-30 RX ORDER — OXYCODONE HYDROCHLORIDE 5 MG/1
10 TABLET ORAL EVERY 4 HOURS PRN
Refills: 0 | Status: CANCELLED | OUTPATIENT
Start: 2025-06-30

## 2025-06-30 RX ORDER — IBUPROFEN 800 MG/1
800 TABLET, FILM COATED ORAL EVERY 8 HOURS
Status: CANCELLED | OUTPATIENT
Start: 2025-07-01

## 2025-06-30 RX ORDER — OXYCODONE HYDROCHLORIDE 5 MG/1
5 TABLET ORAL PRN
Status: DISCONTINUED | OUTPATIENT
Start: 2025-06-30 | End: 2025-06-30 | Stop reason: HOSPADM

## 2025-06-30 RX ORDER — ACETAMINOPHEN 325 MG/1
650 TABLET ORAL EVERY 6 HOURS PRN
Qty: 120 TABLET | Refills: 3 | Status: ON HOLD | OUTPATIENT
Start: 2025-06-30

## 2025-06-30 RX ORDER — SODIUM CHLORIDE 0.9 % (FLUSH) 0.9 %
5-40 SYRINGE (ML) INJECTION PRN
Status: DISCONTINUED | OUTPATIENT
Start: 2025-06-30 | End: 2025-06-30 | Stop reason: HOSPADM

## 2025-06-30 RX ORDER — IBUPROFEN 800 MG/1
800 TABLET, FILM COATED ORAL 3 TIMES DAILY
Qty: 90 TABLET | Refills: 0 | Status: ON HOLD | OUTPATIENT
Start: 2025-06-30

## 2025-06-30 RX ORDER — BUPIVACAINE HYDROCHLORIDE 2.5 MG/ML
INJECTION, SOLUTION EPIDURAL; INFILTRATION; INTRACAUDAL; PERINEURAL PRN
Status: DISCONTINUED | OUTPATIENT
Start: 2025-06-30 | End: 2025-06-30 | Stop reason: ALTCHOICE

## 2025-06-30 RX ORDER — SODIUM CHLORIDE, SODIUM LACTATE, POTASSIUM CHLORIDE, CALCIUM CHLORIDE 600; 310; 30; 20 MG/100ML; MG/100ML; MG/100ML; MG/100ML
INJECTION, SOLUTION INTRAVENOUS CONTINUOUS
Status: DISCONTINUED | OUTPATIENT
Start: 2025-06-30 | End: 2025-06-30 | Stop reason: HOSPADM

## 2025-06-30 RX ORDER — ONDANSETRON 4 MG/1
4 TABLET, ORALLY DISINTEGRATING ORAL 3 TIMES DAILY PRN
Qty: 21 TABLET | Refills: 0 | Status: ON HOLD | OUTPATIENT
Start: 2025-06-30

## 2025-06-30 RX ORDER — FAMOTIDINE 20 MG/1
20 TABLET, FILM COATED ORAL 2 TIMES DAILY
Status: CANCELLED | OUTPATIENT
Start: 2025-06-30

## 2025-06-30 RX ORDER — ACETAMINOPHEN 500 MG
1000 TABLET ORAL ONCE
Status: COMPLETED | OUTPATIENT
Start: 2025-06-30 | End: 2025-06-30

## 2025-06-30 RX ORDER — FENTANYL CITRATE 50 UG/ML
INJECTION, SOLUTION INTRAMUSCULAR; INTRAVENOUS
Status: DISCONTINUED | OUTPATIENT
Start: 2025-06-30 | End: 2025-06-30 | Stop reason: SDUPTHER

## 2025-06-30 RX ORDER — KETOROLAC TROMETHAMINE 30 MG/ML
INJECTION, SOLUTION INTRAMUSCULAR; INTRAVENOUS
Status: DISCONTINUED | OUTPATIENT
Start: 2025-06-30 | End: 2025-06-30 | Stop reason: SDUPTHER

## 2025-06-30 RX ORDER — HYDROMORPHONE HYDROCHLORIDE 1 MG/ML
1 INJECTION, SOLUTION INTRAMUSCULAR; INTRAVENOUS; SUBCUTANEOUS
Refills: 0 | Status: CANCELLED | OUTPATIENT
Start: 2025-06-30

## 2025-06-30 RX ORDER — MIDAZOLAM HYDROCHLORIDE 2 MG/2ML
2 INJECTION, SOLUTION INTRAMUSCULAR; INTRAVENOUS
Status: DISCONTINUED | OUTPATIENT
Start: 2025-06-30 | End: 2025-06-30 | Stop reason: HOSPADM

## 2025-06-30 RX ORDER — DOCUSATE SODIUM 100 MG/1
100 CAPSULE, LIQUID FILLED ORAL 2 TIMES DAILY
Qty: 60 CAPSULE | Refills: 0 | Status: ON HOLD | OUTPATIENT
Start: 2025-06-30 | End: 2025-07-30

## 2025-06-30 RX ORDER — PROCHLORPERAZINE EDISYLATE 5 MG/ML
5 INJECTION INTRAMUSCULAR; INTRAVENOUS
Status: DISCONTINUED | OUTPATIENT
Start: 2025-06-30 | End: 2025-06-30 | Stop reason: HOSPADM

## 2025-06-30 RX ORDER — LIDOCAINE HYDROCHLORIDE 10 MG/ML
1 INJECTION, SOLUTION INFILTRATION; PERINEURAL
Status: DISCONTINUED | OUTPATIENT
Start: 2025-06-30 | End: 2025-06-30 | Stop reason: HOSPADM

## 2025-06-30 RX ORDER — DEXAMETHASONE SODIUM PHOSPHATE 10 MG/ML
INJECTION, SOLUTION INTRA-ARTICULAR; INTRALESIONAL; INTRAMUSCULAR; INTRAVENOUS; SOFT TISSUE
Status: DISCONTINUED | OUTPATIENT
Start: 2025-06-30 | End: 2025-06-30 | Stop reason: SDUPTHER

## 2025-06-30 RX ORDER — DOCUSATE SODIUM 100 MG/1
100 CAPSULE, LIQUID FILLED ORAL 2 TIMES DAILY
Status: CANCELLED | OUTPATIENT
Start: 2025-06-30

## 2025-06-30 RX ORDER — EPHEDRINE SULFATE 5 MG/ML
INJECTION INTRAVENOUS
Status: DISCONTINUED | OUTPATIENT
Start: 2025-06-30 | End: 2025-06-30 | Stop reason: SDUPTHER

## 2025-06-30 RX ORDER — SCOPOLAMINE 1 MG/3D
1 PATCH, EXTENDED RELEASE TRANSDERMAL
Status: DISCONTINUED | OUTPATIENT
Start: 2025-06-30 | End: 2025-06-30 | Stop reason: HOSPADM

## 2025-06-30 RX ORDER — ONDANSETRON 2 MG/ML
4 INJECTION INTRAMUSCULAR; INTRAVENOUS
Status: DISCONTINUED | OUTPATIENT
Start: 2025-06-30 | End: 2025-06-30 | Stop reason: HOSPADM

## 2025-06-30 RX ORDER — DIPHENHYDRAMINE HCL 25 MG
25 CAPSULE ORAL EVERY 6 HOURS PRN
Status: CANCELLED | OUTPATIENT
Start: 2025-06-30

## 2025-06-30 RX ORDER — NEOSTIGMINE METHYLSULFATE 1 MG/ML
INJECTION, SOLUTION INTRAVENOUS
Status: DISCONTINUED | OUTPATIENT
Start: 2025-06-30 | End: 2025-06-30 | Stop reason: SDUPTHER

## 2025-06-30 RX ORDER — PROCHLORPERAZINE EDISYLATE 5 MG/ML
10 INJECTION INTRAMUSCULAR; INTRAVENOUS EVERY 6 HOURS PRN
Status: CANCELLED | OUTPATIENT
Start: 2025-06-30

## 2025-06-30 RX ORDER — OXYCODONE HYDROCHLORIDE 5 MG/1
10 TABLET ORAL PRN
Status: DISCONTINUED | OUTPATIENT
Start: 2025-06-30 | End: 2025-06-30 | Stop reason: HOSPADM

## 2025-06-30 RX ORDER — NALOXONE HYDROCHLORIDE 0.4 MG/ML
INJECTION, SOLUTION INTRAMUSCULAR; INTRAVENOUS; SUBCUTANEOUS PRN
Status: DISCONTINUED | OUTPATIENT
Start: 2025-06-30 | End: 2025-06-30 | Stop reason: HOSPADM

## 2025-06-30 RX ORDER — SODIUM CHLORIDE 0.9 % (FLUSH) 0.9 %
5-40 SYRINGE (ML) INJECTION PRN
Status: CANCELLED | OUTPATIENT
Start: 2025-06-30

## 2025-06-30 RX ORDER — KETAMINE HCL IN NACL, ISO-OSM 20 MG/2 ML
SYRINGE (ML) INJECTION
Status: DISCONTINUED | OUTPATIENT
Start: 2025-06-30 | End: 2025-06-30 | Stop reason: SDUPTHER

## 2025-06-30 RX ADMIN — FENTANYL CITRATE 100 MCG: 50 INJECTION, SOLUTION INTRAMUSCULAR; INTRAVENOUS at 08:07

## 2025-06-30 RX ADMIN — ROCURONIUM BROMIDE 10 MG: 10 INJECTION, SOLUTION INTRAVENOUS at 10:30

## 2025-06-30 RX ADMIN — LIDOCAINE HYDROCHLORIDE 60 MG: 20 INJECTION, SOLUTION EPIDURAL; INFILTRATION; INTRACAUDAL; PERINEURAL at 08:07

## 2025-06-30 RX ADMIN — ROCURONIUM BROMIDE 10 MG: 10 INJECTION, SOLUTION INTRAVENOUS at 09:42

## 2025-06-30 RX ADMIN — CEFAZOLIN 2000 MG: 1 INJECTION, POWDER, FOR SOLUTION INTRAVENOUS at 11:36

## 2025-06-30 RX ADMIN — GLYCOPYRROLATE 0.4 MG: 0.2 INJECTION INTRAMUSCULAR; INTRAVENOUS at 12:29

## 2025-06-30 RX ADMIN — EPHEDRINE SULFATE 10 MG: 5 INJECTION INTRAVENOUS at 08:20

## 2025-06-30 RX ADMIN — SODIUM CHLORIDE, SODIUM LACTATE, POTASSIUM CHLORIDE, AND CALCIUM CHLORIDE: .6; .31; .03; .02 INJECTION, SOLUTION INTRAVENOUS at 06:58

## 2025-06-30 RX ADMIN — Medication 20 MG: at 11:39

## 2025-06-30 RX ADMIN — SODIUM CHLORIDE, SODIUM LACTATE, POTASSIUM CHLORIDE, AND CALCIUM CHLORIDE: 600; 310; 30; 20 INJECTION, SOLUTION INTRAVENOUS at 12:15

## 2025-06-30 RX ADMIN — EPHEDRINE SULFATE 10 MG: 5 INJECTION INTRAVENOUS at 08:36

## 2025-06-30 RX ADMIN — PHENYLEPHRINE HYDROCHLORIDE 100 MCG: 0.1 INJECTION, SOLUTION INTRAVENOUS at 08:18

## 2025-06-30 RX ADMIN — HYDROMORPHONE HYDROCHLORIDE 0.5 MG: 2 INJECTION INTRAMUSCULAR; INTRAVENOUS; SUBCUTANEOUS at 08:58

## 2025-06-30 RX ADMIN — ROCURONIUM BROMIDE 10 MG: 10 INJECTION, SOLUTION INTRAVENOUS at 11:36

## 2025-06-30 RX ADMIN — ACETAMINOPHEN 1000 MG: 500 TABLET, FILM COATED ORAL at 06:46

## 2025-06-30 RX ADMIN — EPHEDRINE SULFATE 5 MG: 5 INJECTION INTRAVENOUS at 11:28

## 2025-06-30 RX ADMIN — ROCURONIUM BROMIDE 10 MG: 10 INJECTION, SOLUTION INTRAVENOUS at 08:36

## 2025-06-30 RX ADMIN — CEFAZOLIN 2000 MG: 1 INJECTION, POWDER, FOR SOLUTION INTRAVENOUS at 08:14

## 2025-06-30 RX ADMIN — DEXAMETHASONE SODIUM PHOSPHATE 8 MG: 10 INJECTION INTRAMUSCULAR; INTRAVENOUS at 08:39

## 2025-06-30 RX ADMIN — Medication 3 MG: at 12:29

## 2025-06-30 RX ADMIN — HYDROMORPHONE HYDROCHLORIDE 0.5 MG: 2 INJECTION INTRAMUSCULAR; INTRAVENOUS; SUBCUTANEOUS at 12:28

## 2025-06-30 RX ADMIN — ROCURONIUM BROMIDE 50 MG: 10 INJECTION, SOLUTION INTRAVENOUS at 08:07

## 2025-06-30 RX ADMIN — Medication 20 MG: at 08:07

## 2025-06-30 RX ADMIN — SODIUM CHLORIDE, SODIUM LACTATE, POTASSIUM CHLORIDE, AND CALCIUM CHLORIDE: 600; 310; 30; 20 INJECTION, SOLUTION INTRAVENOUS at 09:40

## 2025-06-30 RX ADMIN — SODIUM CHLORIDE, SODIUM LACTATE, POTASSIUM CHLORIDE, AND CALCIUM CHLORIDE: 600; 310; 30; 20 INJECTION, SOLUTION INTRAVENOUS at 07:56

## 2025-06-30 RX ADMIN — EPHEDRINE SULFATE 5 MG: 5 INJECTION INTRAVENOUS at 08:29

## 2025-06-30 RX ADMIN — FOSAPREPITANT 150 MG: 150 INJECTION, POWDER, LYOPHILIZED, FOR SOLUTION INTRAVENOUS at 06:57

## 2025-06-30 RX ADMIN — PHENYLEPHRINE HYDROCHLORIDE 50 MCG: 0.1 INJECTION, SOLUTION INTRAVENOUS at 12:27

## 2025-06-30 RX ADMIN — KETOROLAC TROMETHAMINE 30 MG: 30 INJECTION, SOLUTION INTRAMUSCULAR; INTRAVENOUS at 12:34

## 2025-06-30 RX ADMIN — MIDAZOLAM 2 MG: 1 INJECTION INTRAMUSCULAR; INTRAVENOUS at 08:00

## 2025-06-30 ASSESSMENT — PAIN - FUNCTIONAL ASSESSMENT: PAIN_FUNCTIONAL_ASSESSMENT: 0-10

## 2025-06-30 ASSESSMENT — PAIN SCALES - GENERAL
PAINLEVEL_OUTOF10: 5
PAINLEVEL_OUTOF10: 3
PAINLEVEL_OUTOF10: 7

## 2025-06-30 ASSESSMENT — PAIN DESCRIPTION - ORIENTATION: ORIENTATION: INNER

## 2025-06-30 ASSESSMENT — PAIN DESCRIPTION - FREQUENCY: FREQUENCY: INTERMITTENT

## 2025-06-30 ASSESSMENT — PAIN DESCRIPTION - DESCRIPTORS
DESCRIPTORS: CRAMPING
DESCRIPTORS: CRAMPING

## 2025-06-30 ASSESSMENT — PAIN DESCRIPTION - LOCATION
LOCATION: ABDOMEN
LOCATION: ABDOMEN

## 2025-06-30 ASSESSMENT — PAIN DESCRIPTION - PAIN TYPE: TYPE: SURGICAL PAIN

## 2025-06-30 ASSESSMENT — PAIN DESCRIPTION - ONSET: ONSET: AWAKENED FROM SLEEP

## 2025-06-30 NOTE — PERIOP NOTE
This RN spoke with Lila STEARNS about the pts status following transfer to phase II to sign the pt out for discharge. Pt signed out for discharge.

## 2025-06-30 NOTE — ANESTHESIA POSTPROCEDURE EVALUATION
Department of Anesthesiology  Postprocedure Note    Patient: Trav Lundy  MRN: [id number]  YOB: 1975  Date of evaluation: 6/30/2025    Procedure Summary       Date: 06/30/25 [place] [facility] [place] [facility] [place] 04 / [facility] [place] [facility]    Anesthesia Start: 0756 Anesthesia Stop: 1250    Procedure: ERAS ROBOTIC ASSISTED TOTAL LAPAROSCOPIC HYSTERECTOMY WITH BILATERAL SALPINGECTOMY, LYSIS OF ADHESIONS, CYSTOSCOPY. 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    Kurtis Phase I: Kurtis Score: 6    Kurtis Phase II:      Anesthesia Post Evaluation    Patient location during evaluation: PACU  Patient participation: complete - patient participated  Level of consciousness: awake and alert  Airway patency: patent  Nausea & Vomiting: no nausea and no vomiting  Cardiovascular status: hemodynamically stable  Respiratory status: acceptable  Hydration status: euvolemic  Comments: Blood pressure 125/69, pulse 70, temperature 97.3 °F (36.3 °C), temperature source Temporal, resp. rate 26, height 1.575 m (5' 2\"), weight 95 kg (209 lb 8 oz), SpO2 99%.    No apparent anesthetic complications.  Pt stable for discharge from PACU  Multimodal analgesia pain management approach  Pain management: adequate    No notable events documented.

## 2025-06-30 NOTE — ANESTHESIA PRE PROCEDURE
Department of Anesthesiology  Preprocedure Note       Name:  Trav Lundy   Age:  49 y.o.  :  1975                                          MRN:  [id number]         Date:  2025      Surgeon: Surgeon(s):  Lorena Mcrae DO    Procedure: Procedure(s):  ERAS ROBOTIC ASSISTED TOTAL LAPAROSCOPIC HYSTERECTOMY WITH BILATERAL SALPINGECTOMY    Medications prior to admission:   Prior to Admission medications    Medication Sig Start Date End Date Taking? Authorizing Provider   norethindrone (AYGESTIN) 5 MG tablet Take 1 tablet by mouth in the morning and at bedtime 25  Yes Lorena Mcrae DO   norethindrone (AYGESTIN) 5 MG tablet Take 1 tablet by mouth daily 25  Yes Lorena Mcrae DO   ibuprofen (ADVIL;MOTRIN) 800 MG tablet Take 1 tablet by mouth 2 times daily as needed for Pain 24  Yes Lorena Mcrae DO   Dong Quсветлана, Marce sinensis, (DONG QUAI PO) Take by mouth   Yes Kailey Merritt MD   BLACK COHOSH PO Take by mouth   Yes Kailey Merritt MD   Probiotic Product (PRO-BIOTIC BLEND PO) Take by mouth   Yes Kailey Merritt MD   NONFORMULARY Ginger tumeric   Yes Kailey Merritt MD   ferrous Sulfate 300 (60 Fe) MG/5ML solution Take 5 mLs by mouth daily   Yes Kailey Merritt MD       Current medications:    Current Facility-Administered Medications   Medication Dose Route Frequency Provider Last Rate Last Admin   • lidocaine 1 % injection 1 mL  1 mL IntraDERmal Once PRN NILO Lafleur MD       • lactated ringers infusion   IntraVENous Continuous NILO Lafleur MD 75 mL/hr at 25 0658 New Bag at 25 0658   • sodium chloride flush 0.9 % injection 5-40 mL  5-40 mL IntraVENous 2 times per day NILO Lafleur MD       • sodium chloride flush 0.9 % injection 5-40 mL  5-40 mL IntraVENous PRN NILO Lafleur MD       • 0.9 % sodium chloride infusion   IntraVENous PRN NILO Lafleur MD       • midazolam PF (VERSED) injection 2 mg

## 2025-06-30 NOTE — OP NOTE
Operative Report    Patient: Trav Lundy MRN: [id number]  SSN: [id number]    YOB: 1975  Age: 49 y.o.  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 Adhesions very dense with loss of all normal anatomical planes.   - Due to these adhesions, bilateral round ligaments were not visualized until > 45 minutes of tedious dissection   - Normal bilateral ovaries  - Intact bladder with + bilateral ureteral jets     Due to severe adhesions, a second surgeon, Dr. Boston, was required. Having a second surgeon as assistant allowed procedure to be done safely and was key in avoiding bladder injury       Estimated Blood Loss:  100 mL    Drains: None, bladder was backfilled with 150 cc of sterile saline     Specimens:    ID Type Source Tests Collected by Time Destination   A : UTERUS, CERVIX, BILATERAL FALLOPIAN TUBES Tissue Uterus SURGICAL PATHOLOGY Lorena Mcrae DO 6/30/2025 1150          DVT Prophylaxis: SCD Hose    Antibiotic Prophylaxis: ancef (re-dose given at

## 2025-07-01 PROBLEM — N73.6 PELVIC ADHESIONS: Status: ACTIVE | Noted: 2025-07-01

## 2025-07-04 ENCOUNTER — HOSPITAL ENCOUNTER (INPATIENT)
Age: 50
LOS: 1 days | Discharge: HOME | DRG: 392 | End: 2025-07-05
Attending: EMERGENCY MEDICINE | Admitting: HOSPITALIST
Payer: COMMERCIAL

## 2025-07-04 ENCOUNTER — APPOINTMENT (OUTPATIENT)
Dept: CT IMAGING | Age: 50
DRG: 392 | End: 2025-07-04
Payer: COMMERCIAL

## 2025-07-04 DIAGNOSIS — K57.92 DIVERTICULITIS: Primary | ICD-10-CM

## 2025-07-04 DIAGNOSIS — K65.1 INTRA-ABDOMINAL ABSCESS (HCC): ICD-10-CM

## 2025-07-04 PROBLEM — K57.20 DIVERTICULITIS OF COLON WITH PERFORATION: Status: ACTIVE | Noted: 2025-07-04

## 2025-07-04 LAB
ANION GAP SERPL CALC-SCNC: 13 MMOL/L (ref 7–16)
BACTERIA URNS QL MICRO: ABNORMAL /HPF
BASOPHILS # BLD: 0.04 K/UL (ref 0–0.2)
BASOPHILS NFR BLD: 0.3 % (ref 0–2)
BILIRUB UR QL: NEGATIVE
BUN SERPL-MCNC: 11 MG/DL (ref 6–23)
CALCIUM SERPL-MCNC: 9.3 MG/DL (ref 8.8–10.2)
CHLORIDE SERPL-SCNC: 99 MMOL/L (ref 98–107)
CO2 SERPL-SCNC: 24 MMOL/L (ref 20–29)
CREAT SERPL-MCNC: 0.77 MG/DL (ref 0.6–1.1)
DIFFERENTIAL METHOD BLD: ABNORMAL
EOSINOPHIL # BLD: 0.07 K/UL (ref 0–0.8)
EOSINOPHIL NFR BLD: 0.4 % (ref 0.5–7.8)
EPI CELLS #/AREA URNS HPF: ABNORMAL /HPF
ERYTHROCYTE [DISTWIDTH] IN BLOOD BY AUTOMATED COUNT: 12.4 % (ref 11.9–14.6)
GLUCOSE SERPL-MCNC: 108 MG/DL (ref 70–99)
GLUCOSE UR QL STRIP.AUTO: NEGATIVE MG/DL
HCT VFR BLD AUTO: 38.1 % (ref 35.8–46.3)
HGB BLD-MCNC: 12.1 G/DL (ref 11.7–15.4)
IMM GRANULOCYTES # BLD AUTO: 0.05 K/UL (ref 0–0.5)
IMM GRANULOCYTES NFR BLD AUTO: 0.3 % (ref 0–5)
KETONES UR-MCNC: NEGATIVE MG/DL
LACTATE SERPL-SCNC: 0.9 MMOL/L (ref 0.5–2)
LEUKOCYTE ESTERASE UR QL STRIP: ABNORMAL
LYMPHOCYTES # BLD: 1.14 K/UL (ref 0.5–4.6)
LYMPHOCYTES NFR BLD: 7.1 % (ref 13–44)
MCH RBC QN AUTO: 25.4 PG (ref 26.1–32.9)
MCHC RBC AUTO-ENTMCNC: 31.8 G/DL (ref 31.4–35)
MCV RBC AUTO: 79.9 FL (ref 82–102)
MONOCYTES # BLD: 0.6 K/UL (ref 0.1–1.3)
MONOCYTES NFR BLD: 3.8 % (ref 4–12)
MUCOUS THREADS URNS QL MICRO: 0 /LPF
NEUTS SEG # BLD: 14.09 K/UL (ref 1.7–8.2)
NEUTS SEG NFR BLD: 88.1 % (ref 43–78)
NITRITE UR QL: POSITIVE
NRBC # BLD: 0 K/UL (ref 0–0.2)
PH UR: 7.5 (ref 5–9)
PLATELET # BLD AUTO: 250 K/UL (ref 150–450)
PMV BLD AUTO: 10.4 FL (ref 9.4–12.3)
POTASSIUM SERPL-SCNC: 4.3 MMOL/L (ref 3.5–5.1)
PROCALCITONIN SERPL-MCNC: 0.05 NG/ML (ref 0–0.1)
PROT UR QL: NEGATIVE MG/DL
RBC # BLD AUTO: 4.77 M/UL (ref 4.05–5.2)
RBC # UR STRIP: ABNORMAL
RBC #/AREA URNS HPF: ABNORMAL /HPF
SERVICE CMNT-IMP: ABNORMAL
SODIUM SERPL-SCNC: 136 MMOL/L (ref 136–145)
SP GR UR: 1.02 (ref 1–1.02)
UROBILINOGEN UR QL: 0.2 EU/DL (ref 0.2–1)
WBC # BLD AUTO: 16 K/UL (ref 4.3–11.1)
WBC URNS QL MICRO: ABNORMAL /HPF

## 2025-07-04 PROCEDURE — 74177 CT ABD & PELVIS W/CONTRAST: CPT

## 2025-07-04 PROCEDURE — 84145 PROCALCITONIN (PCT): CPT

## 2025-07-04 PROCEDURE — 1100000000 HC RM PRIVATE

## 2025-07-04 PROCEDURE — 87086 URINE CULTURE/COLONY COUNT: CPT

## 2025-07-04 PROCEDURE — 83605 ASSAY OF LACTIC ACID: CPT

## 2025-07-04 PROCEDURE — 6360000002 HC RX W HCPCS: Performed by: EMERGENCY MEDICINE

## 2025-07-04 PROCEDURE — 99285 EMERGENCY DEPT VISIT HI MDM: CPT

## 2025-07-04 PROCEDURE — 96374 THER/PROPH/DIAG INJ IV PUSH: CPT

## 2025-07-04 PROCEDURE — 81001 URINALYSIS AUTO W/SCOPE: CPT

## 2025-07-04 PROCEDURE — 6370000000 HC RX 637 (ALT 250 FOR IP): Performed by: EMERGENCY MEDICINE

## 2025-07-04 PROCEDURE — 96361 HYDRATE IV INFUSION ADD-ON: CPT

## 2025-07-04 PROCEDURE — 87186 SC STD MICRODIL/AGAR DIL: CPT

## 2025-07-04 PROCEDURE — 80048 BASIC METABOLIC PNL TOTAL CA: CPT

## 2025-07-04 PROCEDURE — 6360000004 HC RX CONTRAST MEDICATION: Performed by: EMERGENCY MEDICINE

## 2025-07-04 PROCEDURE — 85025 COMPLETE CBC W/AUTO DIFF WBC: CPT

## 2025-07-04 PROCEDURE — 87040 BLOOD CULTURE FOR BACTERIA: CPT

## 2025-07-04 PROCEDURE — 2580000003 HC RX 258: Performed by: EMERGENCY MEDICINE

## 2025-07-04 PROCEDURE — 87088 URINE BACTERIA CULTURE: CPT

## 2025-07-04 RX ORDER — ACETAMINOPHEN 500 MG
1000 TABLET ORAL
Status: COMPLETED | OUTPATIENT
Start: 2025-07-04 | End: 2025-07-04

## 2025-07-04 RX ORDER — SODIUM CHLORIDE 9 MG/ML
INJECTION, SOLUTION INTRAVENOUS PRN
Status: DISCONTINUED | OUTPATIENT
Start: 2025-07-04 | End: 2025-07-07 | Stop reason: HOSPADM

## 2025-07-04 RX ORDER — SODIUM CHLORIDE 9 MG/ML
INJECTION, SOLUTION INTRAVENOUS CONTINUOUS
Status: ACTIVE | OUTPATIENT
Start: 2025-07-04 | End: 2025-07-05

## 2025-07-04 RX ORDER — ACETAMINOPHEN 325 MG/1
650 TABLET ORAL EVERY 6 HOURS PRN
Status: DISCONTINUED | OUTPATIENT
Start: 2025-07-04 | End: 2025-07-07 | Stop reason: HOSPADM

## 2025-07-04 RX ORDER — SODIUM CHLORIDE 0.9 % (FLUSH) 0.9 %
5-40 SYRINGE (ML) INJECTION PRN
Status: DISCONTINUED | OUTPATIENT
Start: 2025-07-04 | End: 2025-07-07 | Stop reason: HOSPADM

## 2025-07-04 RX ORDER — IOPAMIDOL 755 MG/ML
100 INJECTION, SOLUTION INTRAVASCULAR
Status: COMPLETED | OUTPATIENT
Start: 2025-07-04 | End: 2025-07-04

## 2025-07-04 RX ORDER — POTASSIUM CHLORIDE 1500 MG/1
40 TABLET, EXTENDED RELEASE ORAL PRN
Status: DISCONTINUED | OUTPATIENT
Start: 2025-07-04 | End: 2025-07-07 | Stop reason: HOSPADM

## 2025-07-04 RX ORDER — ONDANSETRON 2 MG/ML
4 INJECTION INTRAMUSCULAR; INTRAVENOUS EVERY 6 HOURS PRN
Status: DISCONTINUED | OUTPATIENT
Start: 2025-07-04 | End: 2025-07-07 | Stop reason: HOSPADM

## 2025-07-04 RX ORDER — SODIUM CHLORIDE 9 MG/ML
INJECTION, SOLUTION INTRAVENOUS CONTINUOUS
Status: DISCONTINUED | OUTPATIENT
Start: 2025-07-04 | End: 2025-07-05 | Stop reason: HOSPADM

## 2025-07-04 RX ORDER — POLYETHYLENE GLYCOL 3350 17 G/17G
17 POWDER, FOR SOLUTION ORAL DAILY PRN
Status: DISCONTINUED | OUTPATIENT
Start: 2025-07-04 | End: 2025-07-07 | Stop reason: HOSPADM

## 2025-07-04 RX ORDER — ACETAMINOPHEN 650 MG/1
650 SUPPOSITORY RECTAL EVERY 6 HOURS PRN
Status: DISCONTINUED | OUTPATIENT
Start: 2025-07-04 | End: 2025-07-07 | Stop reason: HOSPADM

## 2025-07-04 RX ORDER — MAGNESIUM SULFATE IN WATER 40 MG/ML
2000 INJECTION, SOLUTION INTRAVENOUS PRN
Status: DISCONTINUED | OUTPATIENT
Start: 2025-07-04 | End: 2025-07-07 | Stop reason: HOSPADM

## 2025-07-04 RX ORDER — POTASSIUM CHLORIDE 7.45 MG/ML
10 INJECTION INTRAVENOUS PRN
Status: DISCONTINUED | OUTPATIENT
Start: 2025-07-04 | End: 2025-07-07 | Stop reason: HOSPADM

## 2025-07-04 RX ORDER — MORPHINE SULFATE 4 MG/ML
4 INJECTION, SOLUTION INTRAMUSCULAR; INTRAVENOUS
Status: DISCONTINUED | OUTPATIENT
Start: 2025-07-04 | End: 2025-07-07 | Stop reason: HOSPADM

## 2025-07-04 RX ORDER — SODIUM CHLORIDE 0.9 % (FLUSH) 0.9 %
5-40 SYRINGE (ML) INJECTION EVERY 12 HOURS SCHEDULED
Status: DISCONTINUED | OUTPATIENT
Start: 2025-07-04 | End: 2025-07-07 | Stop reason: HOSPADM

## 2025-07-04 RX ORDER — 0.9 % SODIUM CHLORIDE 0.9 %
1000 INTRAVENOUS SOLUTION INTRAVENOUS ONCE
Status: COMPLETED | OUTPATIENT
Start: 2025-07-04 | End: 2025-07-04

## 2025-07-04 RX ORDER — ONDANSETRON 4 MG/1
4 TABLET, ORALLY DISINTEGRATING ORAL EVERY 8 HOURS PRN
Status: DISCONTINUED | OUTPATIENT
Start: 2025-07-04 | End: 2025-07-07 | Stop reason: HOSPADM

## 2025-07-04 RX ADMIN — PIPERACILLIN AND TAZOBACTAM 4500 MG: 4; .5 INJECTION, POWDER, FOR SOLUTION INTRAVENOUS at 22:52

## 2025-07-04 RX ADMIN — SODIUM CHLORIDE: 0.9 INJECTION, SOLUTION INTRAVENOUS at 22:53

## 2025-07-04 RX ADMIN — VANCOMYCIN HYDROCHLORIDE 2000 MG: 10 INJECTION, POWDER, LYOPHILIZED, FOR SOLUTION INTRAVENOUS at 23:32

## 2025-07-04 RX ADMIN — ACETAMINOPHEN 1000 MG: 500 TABLET, FILM COATED ORAL at 21:34

## 2025-07-04 RX ADMIN — IOPAMIDOL 100 ML: 755 INJECTION, SOLUTION INTRAVENOUS at 21:02

## 2025-07-04 RX ADMIN — SODIUM CHLORIDE 1000 ML: 0.9 INJECTION, SOLUTION INTRAVENOUS at 19:41

## 2025-07-04 ASSESSMENT — ENCOUNTER SYMPTOMS
DIARRHEA: 0
VOMITING: 0
COUGH: 0
SHORTNESS OF BREATH: 0
NAUSEA: 0

## 2025-07-04 ASSESSMENT — LIFESTYLE VARIABLES: HOW OFTEN DO YOU HAVE A DRINK CONTAINING ALCOHOL: NEVER

## 2025-07-04 ASSESSMENT — PAIN - FUNCTIONAL ASSESSMENT: PAIN_FUNCTIONAL_ASSESSMENT: 0-10

## 2025-07-04 ASSESSMENT — PAIN SCALES - GENERAL: PAINLEVEL_OUTOF10: 5

## 2025-07-04 NOTE — ED PROVIDER NOTES
Emergency Department Provider Note       PCP: Rona Rossi APRN - NP   Age: 49 y.o.   Sex: female     DISPOSITION                  ICD-10-CM    1. Diverticulitis  K57.92       2. Intra-abdominal abscess (HCC)  K65.1           Medical Decision Making     With her predominantly urinary symptoms I will check her urine to look for any evidence of UTI.  Given tachycardia I will do a sepsis evaluation.  She declined wanting any pain or nausea at this time.  I will treat some IV fluids  ED Course as of 07/04/25 2247 Fri Jul 04, 2025 2109 Patient's urine has no significant white cells or bacteria.  She does have a leukocytosis 16.  Therefore I will get a CT of her abdomen pelvis to look for any postsurgical complications [AC]   2134 Patient spiked a fever so I will give her some Tylenol. [AC]   2219 CT scan reveals a perforated diverticulitis.  I will discuss the case with surgery. [AC]   2235 I spoke with on-call for surgery who advised a hospitalist admission with consideration of IR consult.  I will discuss the case with the hospitalist. [AC]   2245 I spoke with hospitalist who kindly agreed to evaluate the patient [AC]      ED Course User Index  [AC] Harjinder Collier MD     1 or more acute illnesses that pose a threat to life or bodily function.   Shared medical decision making was utilized in creating the patients health plan today.    I independently ordered and reviewed each unique test.       I interpreted the CT Scan small abscess and colon inflammation.    The patient was admitted and I have discussed patient management with the admitting provider.  The management of this patient was discussed with an external consultant.        Critical care procedure note : 30 minutes of critical care time was performed in the emergency department. This was separate from any other procedures listed during the patients emergency department course. The failure to initiate these interventions on an urgent basis would  collection.    Lymph nodes: No suspicious or enlarged lymph nodes by size criteria.    Bones: No acute findings or suspicious lesions.      Impression    1.  Perforated acute sigmoid diverticulitis/colitis with 3.8 cm adjacent  abscess.  2.  Hepatic steatosis with hepatomegaly.          Electronically signed by Jozef Stack MD   CBC with Auto Differential   Result Value Ref Range    WBC 16.0 (H) 4.3 - 11.1 K/uL    RBC 4.77 4.05 - 5.2 M/uL    Hemoglobin 12.1 11.7 - 15.4 g/dL    Hematocrit 38.1 35.8 - 46.3 %    MCV 79.9 (L) 82.0 - 102.0 FL    MCH 25.4 (L) 26.1 - 32.9 PG    MCHC 31.8 31.4 - 35.0 g/dL    RDW 12.4 11.9 - 14.6 %    Platelets 250 150 - 450 K/uL    MPV 10.4 9.4 - 12.3 FL    nRBC 0.00 0.0 - 0.2 K/uL    Differential Type AUTOMATED      Neutrophils % 88.1 (H) 43.0 - 78.0 %    Lymphocytes % 7.1 (L) 13.0 - 44.0 %    Monocytes % 3.8 (L) 4.0 - 12.0 %    Eosinophils % 0.4 (L) 0.5 - 7.8 %    Basophils % 0.3 0.0 - 2.0 %    Immature Granulocytes % 0.3 0.0 - 5.0 %    Neutrophils Absolute 14.09 (H) 1.70 - 8.20 K/UL    Lymphocytes Absolute 1.14 0.50 - 4.60 K/UL    Monocytes Absolute 0.60 0.10 - 1.30 K/UL    Eosinophils Absolute 0.07 0.00 - 0.80 K/UL    Basophils Absolute 0.04 0.00 - 0.20 K/UL    Immature Granulocytes Absolute 0.05 0.0 - 0.5 K/UL   Lactic Acid   Result Value Ref Range    Lactic Acid 0.9 0.5 - 2.0 mmol/L   Procalcitonin   Result Value Ref Range    Procalcitonin 0.05 0.00 - 0.10 ng/mL   Urinalysis, Micro   Result Value Ref Range    WBC, UA 0-3 0 /hpf    RBC, UA 3-5 0 /hpf    Epithelial Cells, UA 3-5 0 /hpf    BACTERIA, URINE 1+ (H) 0 /hpf    Mucus, UA 0 0 /lpf   Basic Metabolic Panel   Result Value Ref Range    Sodium 136 136 - 145 mmol/L    Potassium 4.3 3.5 - 5.1 mmol/L    Chloride 99 98 - 107 mmol/L    CO2 24 20 - 29 mmol/L    Anion Gap 13 7 - 16 mmol/L    Glucose 108 (H) 70 - 99 mg/dL    BUN 11 6 - 23 MG/DL    Creatinine 0.77 0.60 - 1.10 MG/DL    Est, Glom Filt Rate >90 >60 ml/min/1.73m2    Calcium 9.3 8.8

## 2025-07-05 ENCOUNTER — APPOINTMENT (OUTPATIENT)
Dept: CT IMAGING | Age: 50
DRG: 921 | End: 2025-07-05
Attending: HOSPITALIST
Payer: COMMERCIAL

## 2025-07-05 ENCOUNTER — HOSPITAL ENCOUNTER (INPATIENT)
Age: 50
LOS: 2 days | Discharge: HOME HEALTH | DRG: 921 | End: 2025-07-07
Attending: INTERNAL MEDICINE | Admitting: HOSPITALIST
Payer: COMMERCIAL

## 2025-07-05 ENCOUNTER — RESULTS FOLLOW-UP (OUTPATIENT)
Dept: EMERGENCY DEPT | Age: 50
End: 2025-07-05

## 2025-07-05 VITALS
HEART RATE: 78 BPM | OXYGEN SATURATION: 94 % | BODY MASS INDEX: 38.46 KG/M2 | HEIGHT: 62 IN | RESPIRATION RATE: 18 BRPM | DIASTOLIC BLOOD PRESSURE: 76 MMHG | TEMPERATURE: 99.6 F | SYSTOLIC BLOOD PRESSURE: 122 MMHG | WEIGHT: 209 LBS

## 2025-07-05 DIAGNOSIS — K57.20 DIVERTICULITIS OF COLON WITH PERFORATION: Primary | ICD-10-CM

## 2025-07-05 DIAGNOSIS — N93.9 ABNORMAL UTERINE BLEEDING (AUB): ICD-10-CM

## 2025-07-05 PROBLEM — Z90.710 HISTORY OF ROBOT-ASSISTED LAPAROSCOPIC HYSTERECTOMY: Status: ACTIVE | Noted: 2025-07-05

## 2025-07-05 PROBLEM — R93.5 ABNORMAL CT OF THE ABDOMEN: Status: ACTIVE | Noted: 2025-07-05

## 2025-07-05 PROCEDURE — 99222 1ST HOSP IP/OBS MODERATE 55: CPT | Performed by: SURGERY

## 2025-07-05 PROCEDURE — 1100000003 HC PRIVATE W/ TELEMETRY

## 2025-07-05 PROCEDURE — 0D9630Z DRAINAGE OF STOMACH WITH DRAINAGE DEVICE, PERCUTANEOUS APPROACH: ICD-10-PCS | Performed by: RADIOLOGY

## 2025-07-05 PROCEDURE — 99152 MOD SED SAME PHYS/QHP 5/>YRS: CPT | Performed by: RADIOLOGY

## 2025-07-05 PROCEDURE — 99152 MOD SED SAME PHYS/QHP 5/>YRS: CPT

## 2025-07-05 PROCEDURE — 2709999900 CT ABSCESS DRAINAGE SOFT TISSUE

## 2025-07-05 PROCEDURE — 2500000003 HC RX 250 WO HCPCS: Performed by: HOSPITALIST

## 2025-07-05 PROCEDURE — 87205 SMEAR GRAM STAIN: CPT

## 2025-07-05 PROCEDURE — 49406 IMAGE CATH FLUID PERI/RETRO: CPT | Performed by: RADIOLOGY

## 2025-07-05 PROCEDURE — 6360000002 HC RX W HCPCS: Performed by: HOSPITALIST

## 2025-07-05 PROCEDURE — 87070 CULTURE OTHR SPECIMN AEROBIC: CPT

## 2025-07-05 PROCEDURE — 2580000003 HC RX 258: Performed by: HOSPITALIST

## 2025-07-05 PROCEDURE — 6360000002 HC RX W HCPCS: Performed by: RADIOLOGY

## 2025-07-05 RX ORDER — DIPHENHYDRAMINE HYDROCHLORIDE 50 MG/ML
INJECTION, SOLUTION INTRAMUSCULAR; INTRAVENOUS PRN
Status: COMPLETED | OUTPATIENT
Start: 2025-07-05 | End: 2025-07-05

## 2025-07-05 RX ORDER — FENTANYL CITRATE 50 UG/ML
INJECTION, SOLUTION INTRAMUSCULAR; INTRAVENOUS PRN
Status: COMPLETED | OUTPATIENT
Start: 2025-07-05 | End: 2025-07-05

## 2025-07-05 RX ORDER — MIDAZOLAM HYDROCHLORIDE 1 MG/ML
INJECTION, SOLUTION INTRAMUSCULAR; INTRAVENOUS PRN
Status: COMPLETED | OUTPATIENT
Start: 2025-07-05 | End: 2025-07-05

## 2025-07-05 RX ADMIN — SODIUM CHLORIDE: 0.9 INJECTION, SOLUTION INTRAVENOUS at 22:48

## 2025-07-05 RX ADMIN — SODIUM CHLORIDE, PRESERVATIVE FREE 10 ML: 5 INJECTION INTRAVENOUS at 01:38

## 2025-07-05 RX ADMIN — MIDAZOLAM 1 MG: 1 INJECTION INTRAMUSCULAR; INTRAVENOUS at 12:38

## 2025-07-05 RX ADMIN — PIPERACILLIN AND TAZOBACTAM 3375 MG: 3; .375 INJECTION, POWDER, FOR SOLUTION INTRAVENOUS at 08:05

## 2025-07-05 RX ADMIN — SODIUM CHLORIDE, PRESERVATIVE FREE 10 ML: 5 INJECTION INTRAVENOUS at 20:52

## 2025-07-05 RX ADMIN — SODIUM CHLORIDE: 0.9 INJECTION, SOLUTION INTRAVENOUS at 08:18

## 2025-07-05 RX ADMIN — SODIUM CHLORIDE, PRESERVATIVE FREE 10 ML: 5 INJECTION INTRAVENOUS at 08:16

## 2025-07-05 RX ADMIN — PIPERACILLIN AND TAZOBACTAM 3375 MG: 3; .375 INJECTION, POWDER, FOR SOLUTION INTRAVENOUS at 16:47

## 2025-07-05 RX ADMIN — MIDAZOLAM 0.5 MG: 1 INJECTION INTRAMUSCULAR; INTRAVENOUS at 12:47

## 2025-07-05 RX ADMIN — FENTANYL CITRATE 50 MCG: 50 INJECTION, SOLUTION INTRAMUSCULAR; INTRAVENOUS at 12:38

## 2025-07-05 RX ADMIN — FENTANYL CITRATE 25 MCG: 50 INJECTION, SOLUTION INTRAMUSCULAR; INTRAVENOUS at 12:45

## 2025-07-05 RX ADMIN — DIPHENHYDRAMINE HYDROCHLORIDE 50 MG: 50 INJECTION, SOLUTION INTRAMUSCULAR; INTRAVENOUS at 12:20

## 2025-07-05 ASSESSMENT — PAIN SCALES - GENERAL
PAINLEVEL_OUTOF10: 0
PAINLEVEL_OUTOF10: 0

## 2025-07-05 NOTE — PRE SEDATION
Sedation Pre-Procedure Note    Patient Name: Trav Lundy   YOB: 1975  Room/Bed: [facility]/  Medical Record Number: [id number]  Date: 2025   Time: 12:19 PM       Indication:  abscess    Consent: I have discussed with the patient and/or the patient representative the indication, alternatives, and the possible risks and/or complications of the planned procedure and the anesthesia methods. The patient and/or patient representative appear to understand and agree to proceed.    Vital Signs:   Vitals:    25 0801   BP: 120/71   Pulse: 87   Resp: 18   Temp: 99.9 °F (37.7 °C)   SpO2: 94%       Past Medical History:   has a past medical history of Anemia and Menorrhagia.    Past Surgical History:   has a past surgical history that includes  section; Fountain tooth extraction; Dilation and curettage of uterus (N/A, 2024); and Hysterectomy (Bilateral, 2025).    Medications:   Scheduled Meds:    sodium chloride flush  5-40 mL IntraVENous 2 times per day    piperacillin-tazobactam  3,375 mg IntraVENous Q8H     Continuous Infusions:    sodium chloride      sodium chloride 100 mL/hr at 25 0818     PRN Meds: sodium chloride flush, sodium chloride, potassium chloride **OR** potassium alternative oral replacement **OR** potassium chloride, magnesium sulfate, ondansetron **OR** ondansetron, polyethylene glycol, acetaminophen **OR** acetaminophen, morphine  Home Meds:   Prior to Admission medications    Medication Sig Start Date End Date Taking? Authorizing Provider   ibuprofen (ADVIL;MOTRIN) 800 MG tablet Take 1 tablet by mouth in the morning, at noon, and at bedtime 25  Yes Lorena Mcrae DO   acetaminophen (AMINOFEN) 325 MG tablet Take 2 tablets by mouth every 6 hours as needed for Pain 25  Yes Lorena Mcrae DO   phenazopyridine (PYRIDIUM) 100 MG tablet Take 1 tablet by mouth in the morning and at bedtime 25 Yes Lorena Mcrae,    oxyCODONE

## 2025-07-05 NOTE — OP NOTE
[facility]  Department of Interventional Radiology  [phone]        Interventional Radiology Brief Procedure Note    Patient: Trav Lundy MRN: [id number]  SSN: [id number]    YOB: 1975  Age: 49 y.o.  Sex: female      Date of Procedure: 7/5/2025    Pre-Procedure Diagnosis: abscess    Post-Procedure Diagnosis: post op fluid    Procedure(s): Image Guided Drain Placement    Brief Description of Procedure: as above    Performed By: THELMA DAWKINS MD     Assistants: None    Anesthesia:Moderate Sedation    Estimated Blood Loss: Less than 10ml    Specimens:  Microbiology    Implants:  All Purpose Drain    Findings: rlq collection is not purulent.  Sent for labs    Complications: None    Recommendations: external drainage with 4 ml flushes but I doubt much output     Follow Up: pt will follow up in IR in 1 week to hopefully remove the drain    Signed By: THELMA DAWKINS MD     July 5, 2025

## 2025-07-05 NOTE — H&P
Hospitalist History and Physical   Admit Date:  2025  7:11 PM   Name:  Trav Lundy   Age:  49 y.o.  Sex:  female  :  1975   MRN:  [id number]   Room:      Presenting/Chief Complaint: Fever     Reason(s) for Admission: Diverticulitis of colon with perforation [K57.20]     History of Present Illness:     49 years old female with recent history of robotic assisted total laparoscopic hysterectomy done 5 days ago presented to emergency room with chief complaint of fever, abdominal pain mainly in the lower abdomen.  Fever started today but abdominal discomfort was there since surgery which slightly got worse.  Evaluated in ER, CT scan of abdomen was done shows diverticulitis with perforation and small abscess.  General surgery was contacted who recommended hospitalist admission and IR drainage of abscess.  Patient was given antibiotics after blood cultures, ER physician requested admission of this patient for further evaluation and management.          Assessment & Plan:     Diverticulitis with perforation and abscess: [place] for IR intervention, initiated on Zosyn, follow cultures.  Will require general surgery evaluation as well.  Will provide pain medications.          Diet: npo  VTE prophylaxis: SCD  Code status: Full Code        Non-peripheral Lines and Tubes (if present):             Hospital Problems:  Principal Problem:    Diverticulitis of colon with perforation  Resolved Problems:    * No resolved hospital problems. *        Objective:   Patient Vitals for the past 24 hrs:   Temp Pulse Resp BP SpO2   25 2121 (!) 101.5 °F (38.6 °C) 85 18 128/69 97 %   25 -- -- -- 128/71 --   25 1907 99.6 °F (37.6 °C) (!) 102 20 (!) 135/92 97 %       Oxygen Therapy  SpO2: 97 %  Pulse via Oximetry: 85 beats per minute    Estimated body mass index is 38.23 kg/m² as calculated from the following:    Height as of this encounter: 1.575 m (5' 2\").

## 2025-07-05 NOTE — ED NOTES
TRANSFER - OUT REPORT:    Verbal report given to Delmi on Trav Lundy  being transferred to [facility] for routine progression of patient care       Report consisted of patient's Situation, Background, Assessment and   Recommendations(SBAR).     Information from the following report(s) Nurse Handoff Report was reviewed with the receiving nurse.    Lines:   Peripheral IV Proximal;Right Forearm (Active)       Peripheral IV 07/04/25 Right Antecubital (Active)        Opportunity for questions and clarification was provided.      Patient transported with:  transport

## 2025-07-05 NOTE — CONSULTS
Consult to ANAI guevara. View MD Jovanna note on 7/5/25.  
My review of the ct shows a right sided collection that likely represents post op abscess not diverticulitis.  This may be amenable to percutaneous drainage with ct guidance.  Please keep the patient NPO as this may be performed later today  
extended release tablet 40 mEq  40 mEq Oral PRN    Or    potassium bicarb-citric acid (EFFER-K) effervescent tablet 40 mEq  40 mEq Oral PRN    Or    potassium chloride 10 mEq/100 mL IVPB (Peripheral Line)  10 mEq IntraVENous PRN    magnesium sulfate 2000 mg in 50 mL IVPB premix  2,000 mg IntraVENous PRN    ondansetron (ZOFRAN-ODT) disintegrating tablet 4 mg  4 mg Oral Q8H PRN    Or    ondansetron (ZOFRAN) injection 4 mg  4 mg IntraVENous Q6H PRN    polyethylene glycol (GLYCOLAX) packet 17 g  17 g Oral Daily PRN    acetaminophen (TYLENOL) tablet 650 mg  650 mg Oral Q6H PRN    Or    acetaminophen (TYLENOL) suppository 650 mg  650 mg Rectal Q6H PRN    0.9 % sodium chloride infusion   IntraVENous Continuous    morphine sulfate (PF) injection 4 mg  4 mg IntraVENous Q3H PRN    piperacillin-tazobactam (ZOSYN) 3,375 mg in sodium chloride 0.9 % 50 mL IVPB (addEASE)  3,375 mg IntraVENous Q8H     ALLERGIES:  Patient has no known allergies.    Social History     Socioeconomic History    Marital status:    Tobacco Use    Smoking status: Never    Smokeless tobacco: Never   Vaping Use    Vaping status: Never Used   Substance and Sexual Activity    Alcohol use: Never    Drug use: Never    Sexual activity: Yes     Partners: Male     Social Drivers of Health     Financial Resource Strain: Patient Declined (5/13/2024)    Overall Financial Resource Strain (CARDIA)     Difficulty of Paying Living Expenses: Patient declined   Food Insecurity: No Food Insecurity (7/5/2025)    Hunger Vital Sign     Worried About Running Out of Food in the Last Year: Never true     Ran Out of Food in the Last Year: Never true   Transportation Needs: No Transportation Needs (7/5/2025)    PRAPARE - Transportation     Lack of Transportation (Medical): No     Lack of Transportation (Non-Medical): No   Housing Stability: Low Risk  (7/5/2025)    Housing Stability Vital Sign     Unable to Pay for Housing in the Last Year: No     Number of Times Moved in the

## 2025-07-05 NOTE — PLAN OF CARE
Problem: Discharge Planning  Goal: Discharge to home or other facility with appropriate resources  Outcome: Progressing      ----- Message from Yaniv Chandler MD sent at 6/27/2022 11:31 AM CDT -----  Please call family with  to let them know his bilirubin levels were totally normal; thanks!

## 2025-07-05 NOTE — PLAN OF CARE
Problem: Discharge Planning  Goal: Discharge to home or other facility with appropriate resources  7/5/2025 1514 by Carole West, RN  Outcome: Progressing  7/5/2025 0347 by Ruth Ribeiro, RN  Outcome: Progressing

## 2025-07-06 LAB
BACTERIA SPEC CULT: ABNORMAL
BACTERIA SPEC CULT: NORMAL
BACTERIA SPEC CULT: NORMAL
ERYTHROCYTE [DISTWIDTH] IN BLOOD BY AUTOMATED COUNT: 12.5 % (ref 11.9–14.6)
HCT VFR BLD AUTO: 34.4 % (ref 35.8–46.3)
HGB BLD-MCNC: 10.9 G/DL (ref 11.7–15.4)
MCH RBC QN AUTO: 26.3 PG (ref 26.1–32.9)
MCHC RBC AUTO-ENTMCNC: 31.7 G/DL (ref 31.4–35)
MCV RBC AUTO: 83.1 FL (ref 82–102)
NRBC # BLD: 0 K/UL (ref 0–0.2)
PLATELET # BLD AUTO: 216 K/UL (ref 150–450)
PMV BLD AUTO: 9.7 FL (ref 9.4–12.3)
RBC # BLD AUTO: 4.14 M/UL (ref 4.05–5.2)
SERVICE CMNT-IMP: ABNORMAL
SERVICE CMNT-IMP: NORMAL
SERVICE CMNT-IMP: NORMAL
WBC # BLD AUTO: 16.5 K/UL (ref 4.3–11.1)

## 2025-07-06 PROCEDURE — 2580000003 HC RX 258: Performed by: HOSPITALIST

## 2025-07-06 PROCEDURE — 85027 COMPLETE CBC AUTOMATED: CPT

## 2025-07-06 PROCEDURE — 36415 COLL VENOUS BLD VENIPUNCTURE: CPT

## 2025-07-06 PROCEDURE — 6360000002 HC RX W HCPCS: Performed by: HOSPITALIST

## 2025-07-06 PROCEDURE — 6370000000 HC RX 637 (ALT 250 FOR IP): Performed by: HOSPITALIST

## 2025-07-06 PROCEDURE — 2500000003 HC RX 250 WO HCPCS: Performed by: HOSPITALIST

## 2025-07-06 PROCEDURE — 99231 SBSQ HOSP IP/OBS SF/LOW 25: CPT | Performed by: SURGERY

## 2025-07-06 PROCEDURE — 1100000003 HC PRIVATE W/ TELEMETRY

## 2025-07-06 RX ADMIN — SODIUM CHLORIDE, PRESERVATIVE FREE 10 ML: 5 INJECTION INTRAVENOUS at 20:09

## 2025-07-06 RX ADMIN — PIPERACILLIN AND TAZOBACTAM 3375 MG: 3; .375 INJECTION, POWDER, FOR SOLUTION INTRAVENOUS at 15:49

## 2025-07-06 RX ADMIN — ACETAMINOPHEN 650 MG: 325 TABLET ORAL at 20:06

## 2025-07-06 RX ADMIN — SODIUM CHLORIDE, PRESERVATIVE FREE 10 ML: 5 INJECTION INTRAVENOUS at 08:33

## 2025-07-06 RX ADMIN — PIPERACILLIN AND TAZOBACTAM 3375 MG: 3; .375 INJECTION, POWDER, FOR SOLUTION INTRAVENOUS at 08:28

## 2025-07-06 RX ADMIN — PIPERACILLIN AND TAZOBACTAM 3375 MG: 3; .375 INJECTION, POWDER, FOR SOLUTION INTRAVENOUS at 00:29

## 2025-07-06 ASSESSMENT — PAIN DESCRIPTION - DESCRIPTORS: DESCRIPTORS: ACHING

## 2025-07-06 ASSESSMENT — PAIN SCALES - GENERAL
PAINLEVEL_OUTOF10: 0
PAINLEVEL_OUTOF10: 3
PAINLEVEL_OUTOF10: 0

## 2025-07-06 ASSESSMENT — PAIN DESCRIPTION - LOCATION: LOCATION: HEAD

## 2025-07-06 NOTE — PLAN OF CARE
Problem: Discharge Planning  Goal: Discharge to home or other facility with appropriate resources  7/6/2025 1146 by Carole West, RN  Outcome: Progressing  7/6/2025 0008 by Ruth Ribeiro, RN  Outcome: Progressing

## 2025-07-06 NOTE — PLAN OF CARE
Problem: Discharge Planning  Goal: Discharge to home or other facility with appropriate resources  7/6/2025 0008 by Ruth Ribeiro, RN  Outcome: Progressing  7/5/2025 1514 by Carole West, RN  Outcome: Progressing

## 2025-07-06 NOTE — CARE COORDINATION
Pt chart reviewed for discharge planning. MSW met with pt at bedside, verified demographic information/ health insurance. Pt lives with spouse in one level home, states independent with ADLs, uses no DME, and drives . PCP was confirmed. Pt reports no outside services in the home. MSW will follow pt plan of care and assist with supportive care referrals pending pt clinical progress.  Please consult case management if specific needs arise.        07/06/25 1434   Service Assessment   Patient Orientation Alert and Oriented   Cognition Alert   History Provided By Patient   Primary Caregiver Self   Support Systems Spouse/Significant Other   Patient's Healthcare Decision Maker is: Legal Next of Kin   PCP Verified by CM Yes   Last Visit to PCP Within last 6 months   Prior Functional Level Independent in ADLs/IADLs   Current Functional Level Independent in ADLs/IADLs   Can patient return to prior living arrangement Yes   Ability to make needs known: Good   Family able to assist with home care needs: Yes   Would you like for me to discuss the discharge plan with any other family members/significant others, and if so, who? No   Financial Resources Other (Comment)  (Planned Adminstrators)   Community Resources None   Social/Functional History   Lives With Spouse   Type of Home House   Home Layout One level   Home Equipment None   Receives Help From Family   Prior Level of Assist for ADLs Independent   Ambulation Assistance Independent   Active  Yes   Discharge Planning   Type of Residence House   Living Arrangements Spouse/Significant Other   Current Services Prior To Admission None   Potential Assistance Needed N/A   DME Ordered? No   Potential Assistance Purchasing Medications No   Type of Home Care Services None   Patient expects to be discharged to: House   One/Two Story Residence One story   Services At/After Discharge   Transition of Care Consult (CM Consult) Discharge Planning   Condition of Participation:

## 2025-07-07 VITALS
SYSTOLIC BLOOD PRESSURE: 104 MMHG | DIASTOLIC BLOOD PRESSURE: 70 MMHG | WEIGHT: 202.82 LBS | BODY MASS INDEX: 38.29 KG/M2 | OXYGEN SATURATION: 97 % | HEIGHT: 61 IN | HEART RATE: 80 BPM | TEMPERATURE: 98.4 F | RESPIRATION RATE: 17 BRPM

## 2025-07-07 LAB
BACTERIA SPEC CULT: ABNORMAL
BACTERIA SPEC CULT: NORMAL
GRAM STN SPEC: NORMAL
GRAM STN SPEC: NORMAL
SERVICE CMNT-IMP: ABNORMAL
SERVICE CMNT-IMP: NORMAL

## 2025-07-07 PROCEDURE — 6370000000 HC RX 637 (ALT 250 FOR IP): Performed by: HOSPITALIST

## 2025-07-07 PROCEDURE — 6360000002 HC RX W HCPCS: Performed by: HOSPITALIST

## 2025-07-07 PROCEDURE — 2580000003 HC RX 258: Performed by: HOSPITALIST

## 2025-07-07 RX ORDER — SACCHAROMYCES BOULARDII 250 MG
250 CAPSULE ORAL 2 TIMES DAILY
Qty: 20 CAPSULE | Refills: 0 | Status: SHIPPED | OUTPATIENT
Start: 2025-07-07 | End: 2025-07-17

## 2025-07-07 RX ORDER — POLYETHYLENE GLYCOL 3350 17 G/17G
17 POWDER, FOR SOLUTION ORAL DAILY PRN
Qty: 527 G | Refills: 1 | Status: SHIPPED | OUTPATIENT
Start: 2025-07-07 | End: 2025-08-06

## 2025-07-07 RX ORDER — OXYCODONE HYDROCHLORIDE 5 MG/1
5 TABLET ORAL EVERY 8 HOURS PRN
Qty: 15 TABLET | Refills: 0 | Status: SHIPPED | OUTPATIENT
Start: 2025-07-07 | End: 2025-07-12

## 2025-07-07 RX ORDER — ONDANSETRON 4 MG/1
4 TABLET, ORALLY DISINTEGRATING ORAL EVERY 8 HOURS PRN
Qty: 30 TABLET | Refills: 1 | Status: SHIPPED | OUTPATIENT
Start: 2025-07-07

## 2025-07-07 RX ADMIN — PIPERACILLIN AND TAZOBACTAM 3375 MG: 3; .375 INJECTION, POWDER, FOR SOLUTION INTRAVENOUS at 00:03

## 2025-07-07 RX ADMIN — AMOXICILLIN AND CLAVULANATE POTASSIUM 1 TABLET: 875; 125 TABLET, FILM COATED ORAL at 13:14

## 2025-07-07 RX ADMIN — PIPERACILLIN AND TAZOBACTAM 3375 MG: 3; .375 INJECTION, POWDER, FOR SOLUTION INTRAVENOUS at 07:30

## 2025-07-07 NOTE — DISCHARGE SUMMARY
10 ml   Net 1063.42 ml         Physical Exam:    General:    Well nourished.  No overt distress  Head:  Normocephalic, atraumatic  Eyes:  Sclerae appear normal.  Pupils equally round.    HENT:  Nares appear normal, no drainage.  Moist mucous membranes  Neck:  No restricted ROM.  Trachea midline  CV:   RRR.  No m/r/g.  No JVD  Lungs:   CTAB.  No wheezing, rhonchi, or rales.  Respirations even, unlabored  Abdomen:   Soft, nontender, nondistended.  Drain in place with light pink fluid in drain bulb  Extremities: Warm and dry.   No edema.    Skin:     No rashes.  Normal coloration  Neuro:  CN II-XII grossly intact.  Psych:  Normal mood and affect.        Time spent in patient discharge and coordination 36 minutes.      Signed:  Red Ann MD    Part of this note may have been written by using a voice dictation software.  The note has been proof read but may still contain some grammatical/other typographical errors.

## 2025-07-07 NOTE — PROGRESS NOTES
Hospitalist Progress Note   Admit Date:  2025  1:08 AM   Name:  Trav Lundy   Age:  49 y.o.  Sex:  female  :  1975   MRN:  [id number]   Room:  /    Presenting/Chief Complaint: No chief complaint on file.     Reason(s) for Admission: Diverticulitis of colon with perforation [K57.20]     Hospital Course:   Trav Lundy is a 49 y.o. female with medical history of robotic assisted total laparoscopic hysterectomy done 5 days ago presented to emergency room with chief complaint of fever, abdominal pain mainly in the lower abdomen.  Had fever on .  CT scan of abdomen was done shows diverticulitis with perforation and small abscess. General surgery was contacted who recommended hospitalist admission and IR drainage of abscess.       Subjective & 24hr Events:   Patient was seen and examined at bedside.  No acute events were noted overnight.  Patient remains afebrile since admission.    Patient reports no pain, nausea or vomiting.  Does not have any acute complaints at this time.  Assessment & Plan:     Acute diverticulitis with perforation and abscess  -Continue with Zosyn  -Keep n.p.o.  -Continue IV fluid  -General Surgery has been consulted.  Pending evaluation  -IR consulted for drainage placement.    Recent laparoscopic hysterectomy  -Continue with as needed pain medication    Anticipated Discharge Arrangements:   Home    PT/OT evals ordered?  Not ordered; patient not expected to need rehab  Diet:  Diet NPO  VTE prophylaxis: SCD's   Code status: Full Code      Non-peripheral Lines and Tubes (if present):          Telemetry (if present):  Cardiac/Telemetry Monitor On: Portable telemetry pack applied        Hospital Problems:  Principal Problem:    Diverticulitis of colon with perforation  Resolved Problems:    * No resolved hospital problems. *      Objective:   Patient Vitals for the past 24 hrs:   Temp Pulse Resp BP SpO2   25 1245 -- 90 15 123/68 --   25 1241 -- 82 
4 Eyes Skin Assessment     NAME:  Trav Lundy  YOB: 1975  MEDICAL RECORD NUMBER:  [id number]    The patient is being assessed for  Admission    I agree that at least one RN has performed a thorough Head to Toe Skin Assessment on the patient. ALL assessment sites listed below have been assessed.      Areas assessed by both nurses:    Head, Face, Ears, Shoulders, Back, Chest, Arms, Elbows, Hands, Sacrum. Buttock, Coccyx, Ischium, Legs. Feet and Heels, and Under Medical Devices         Does the Patient have a Wound? No noted wound(s)       Octavio Prevention initiated by RN: Yes  Wound Care Orders initiated by RN: No    Pressure Injury (Stage 1,2,3,4, Unstageable, DTI, NWPT, and Complex wounds) if present, place Wound referral order by RN under : No    New Ostomies, if present place, Ostomy referral order under : No     Nurse 1 eSignature: Electronically signed by Ruth Lopez RN on 7/5/25 at 3:48 AM EDT    **SHARE this note so that the co-signing nurse can place an eSignature**    Nurse 2 eSignature: Electronically signed by Lexii George RN on 7/5/25 at 3:55 AM EDT   
Discharge instructions reviewed with patient and spouse at bedside. Teaching done on JACKIE drain care, patient verbalized understanding of instructions. Patient declined HH as she has a daughter who is an RN and will be helping with care. Supplies provided. Wheeled down to lobby by primary RN/writer.   
Progress Note/Office Note:   Trav Lundy  MRN: [id number]  :1975  Age:49 y.o.    General Surgery Consult ordered by: Dr Camp; Hospitalist  Reason for General Surgery Consult: ? Diverticulitis w/ Perf    Subjective:      25: Pt awake in bed. No current complaints or new issues. Wants to eat. IR drain placed yesterday. No family at bedside.       HPI: Trav Lundy is a 49 y.o. female with a past medical history of anemia and menorrhagia who recently underwent a Robotic Assisted Total Hysterectomy 25 by Dr Mcrae. Pt presented to the ED 25 due to concerns of a fever. She denies nausea, vomiting, and increased abdominal pain. CT was read as perforated acute sigmoid diverticulitis w/ Abscess. Pt was admitted by Hospitalist team with IR and General Surgery consults.     Labs in ED: WBC 16    25 CT Abdomen/ Pelvis  IMPRESSION:  1.  Perforated acute sigmoid diverticulitis/colitis with 3.8 cm adjacent  abscess.  2.  Hepatic steatosis with hepatomegaly.    Past Surgical History: C section, Hysterectomy 25    Pt does not take blood thinners.     Physical Exam:        /61   Pulse 81   Temp 98.6 °F (37 °C) (Oral)   Resp 18   Ht 1.549 m (5' 1\")   Wt 92 kg (202 lb 13.2 oz)   SpO2 97%   BMI 38.32 kg/m²   Vitals:    25 0001 25 0246 25 0653   BP: 130/71  112/67 107/61   Pulse: 89 85 84 81   Resp: 18  18 18   Temp: 99.9 °F (37.7 °C)  100 °F (37.8 °C) 98.6 °F (37 °C)   TempSrc: Oral  Axillary Oral   SpO2: 97%  94% 97%   Weight:       Height:           Constitutional: Alert, oriented, cooperative patient in no acute distress; appears stated age    Eyes: Sclera are clear.   ENMT: no external lesions; no obvious neck masses, no ear or lip lesions, nares normal  CV: RRR. Normal perfusion  Resp: No JVD.  Breathing is  non-labored; no audible wheezing. Room air  GI: soft and non-distended, mildly ttp lower abdomen, JACKIE drain with ss 
TRANSFER - IN REPORT:    Verbal report received from DAISY Mccarty on Trav Lundy  being received from [facility] ED for routine progression of patient care      Report consisted of patient's Situation, Background, Assessment and   Recommendations(SBAR).     Information from the following report(s) Adult Overview and Recent Results was reviewed with the receiving nurse.    Opportunity for questions and clarification was provided.      Assessment completed upon patient's arrival to unit and care assumed.    
CULTURE IN PROGRESS,FURTHER UPDATES TO FOLLOW      Culture        10,000 to 50,000 COLONIES/mL MIXED SKIN LISA ISOLATED   Urinalysis, Micro    Collection Time: 07/04/25  7:43 PM   Result Value Ref Range    WBC, UA 0-3 0 /hpf    RBC, UA 3-5 0 /hpf    Epithelial Cells, UA 3-5 0 /hpf    BACTERIA, URINE 1+ (H) 0 /hpf    Mucus, UA 0 0 /lpf   Basic Metabolic Panel    Collection Time: 07/04/25  7:43 PM   Result Value Ref Range    Sodium 136 136 - 145 mmol/L    Potassium 4.3 3.5 - 5.1 mmol/L    Chloride 99 98 - 107 mmol/L    CO2 24 20 - 29 mmol/L    Anion Gap 13 7 - 16 mmol/L    Glucose 108 (H) 70 - 99 mg/dL    BUN 11 6 - 23 MG/DL    Creatinine 0.77 0.60 - 1.10 MG/DL    Est, Glom Filt Rate >90 >60 ml/min/1.73m2    Calcium 9.3 8.8 - 10.2 MG/DL   Culture, Blood 1    Collection Time: 07/04/25 10:40 PM    Specimen: Blood   Result Value Ref Range    Special Requests RIGHT  Antecubital        Culture NO GROWTH 2 DAYS     Culture, Blood 2    Collection Time: 07/04/25 10:40 PM    Specimen: Blood   Result Value Ref Range    Special Requests RIGHT  HAND        Culture NO GROWTH 2 DAYS     Culture, Wound (with Gram Stain)    Collection Time: 07/05/25 12:50 PM    Specimen: Abscess    DIVERTICULAR   Result Value Ref Range    Special Requests NO SPECIAL REQUESTS      Gram Stain OCCASIONAL WBCS SEEN      Gram Stain NO DEFINITE ORGANISM SEEN      Culture NO GROWTH 1 DAY     CBC    Collection Time: 07/06/25 10:06 AM   Result Value Ref Range    WBC 16.5 (H) 4.3 - 11.1 K/uL    RBC 4.14 4.05 - 5.2 M/uL    Hemoglobin 10.9 (L) 11.7 - 15.4 g/dL    Hematocrit 34.4 (L) 35.8 - 46.3 %    MCV 83.1 82 - 102 FL    MCH 26.3 26.1 - 32.9 PG    MCHC 31.7 31.4 - 35.0 g/dL    RDW 12.5 11.9 - 14.6 %    Platelets 216 150 - 450 K/uL    MPV 9.7 9.4 - 12.3 FL    nRBC 0.00 0.0 - 0.2 K/uL       No results for input(s): \"COVID19\" in the last 72 hours.    Current Meds:  Current Facility-Administered Medications   Medication Dose Route Frequency    sodium chloride

## 2025-07-07 NOTE — CARE COORDINATION
RN CASEY in room, patient sitting up in bed, alert and oriented, no family present at this time. Patient will be discharging home with abdominal drain. ZOHREH RN list provided to patient and she requests BS by Shar[facility]. Referral will be sent. She states  will pick her up and take home at discharge. She denies any other needs and is aware RN CASEY will continue to monitor for DC needs.     7704 Update - BS Sharmin has picked up for ZOHREH VILLA

## 2025-07-07 NOTE — CARE COORDINATION
Patient with DC orders today. Her  will pick her up and taker her home. After looking at HH options referral sent to [facility] for [facility] care. Vero with Sharmin in room and patient denies any need for HH. States no issues with follow ups or picking up medications at discharge. No additional needs made known to RN CM. Patient and family with no questions or concerns. Patient has met all treatment goals and milestones for discharge. Patient to contact CM if any new needs arise.         07/07/25 1310   Services At/After Discharge   Transition of Care Consult (CM Consult) Home Health;Discharge Planning   Internal Home Health Yes   Services At/After Discharge Home Health   [place] Resource Information Provided? No   Mode of Transport at Discharge Self   Confirm Follow Up Transport Family   Condition of Participation: Discharge Planning   The Plan for Transition of Care is related to the following treatment goals: return to baseline with [facility] and family   The Patient and/or Patient Representative was provided with a Choice of Provider? Patient   The Patient and/Or Patient Representative agree with the Discharge Plan? Yes   Freedom of Choice list was provided with basic dialogue that supports the patient's individualized plan of care/goals, treatment preferences, and shares the quality data associated with the providers?  Yes

## 2025-07-09 LAB
BACTERIA SPEC CULT: NORMAL
BACTERIA SPEC CULT: NORMAL
SERVICE CMNT-IMP: NORMAL
SERVICE CMNT-IMP: NORMAL

## 2025-07-11 ENCOUNTER — HOSPITAL ENCOUNTER (OUTPATIENT)
Dept: INTERVENTIONAL RADIOLOGY/VASCULAR | Age: 50
Discharge: HOME | End: 2025-07-13
Attending: RADIOLOGY

## 2025-07-11 VITALS
DIASTOLIC BLOOD PRESSURE: 74 MMHG | WEIGHT: 202 LBS | HEIGHT: 61 IN | RESPIRATION RATE: 18 BRPM | BODY MASS INDEX: 38.14 KG/M2 | HEART RATE: 71 BPM | SYSTOLIC BLOOD PRESSURE: 113 MMHG | TEMPERATURE: 97.5 F | OXYGEN SATURATION: 96 %

## 2025-07-11 ASSESSMENT — PAIN - FUNCTIONAL ASSESSMENT: PAIN_FUNCTIONAL_ASSESSMENT: NONE - DENIES PAIN

## 2025-07-11 NOTE — DISCHARGE INSTRUCTIONS
[facility]     Department of Interventional Radiology     [facility]    [phone] Office     [phone] Fax       GENERAL DRAIN DISCHARGE INSTRUCTIONS           General Information:        A plastic catheter has been inserted through your skin and into area that needs to be drained. Your doctor ordered this procedure to be done in the event of an abscess, or other fluid collection in your body. You will notice a drainage bag attached to the catheter. When the fluid has all been removed, your doctor will send you back to us for the removal of the catheter. If you are going home with the catheter in place, your doctor may order a home health nurse to come to your house and assist with the care of the catheter. Your doctor will most likely order antibiotic tablets for you to take while you have this tube.              Home Care Instructions:        You can resume your regular diet and medication regimen. Do not drink alcohol, drive, or make any important legal decisions in the next 24 hours. Do not lift anything heavier than a gallon of milk, or do anything strenuous for the next 24 hours. You should not shower for 24 hours. You should cover the tube with plastic wrap and tape to keep it dry when you shower. You can disconnect the drainage bag while showering. The home health nurse or the nurse who discharges from the hospital will teach you how to do this.  Do not take a bath, swim or immerse yourself in water as long as you have this drainage tube. You should clean the tube and change the dressing every  48 hours and as needed. Keep the dressing clean and dry. Keep up with how much drainage you get from the tube and report this to your doctor on each visit.           Call If:        You should call your Physician and/or the Radiology Nurse if you have any bleeding other than a small spot on your bandage. Call if you have any signs of infection, fever,

## 2025-07-11 NOTE — FLOWSHEET NOTE
Recovery period without difficulty. Pt alert and oriented and denies pain. Dressing is clean, dry, and intact. Reviewed discharge instructions with patient and family, both verbalized understanding.

## 2025-07-11 NOTE — PROGRESS NOTES
Mrs. Lundy was seen in IR for drain evaluation. It appears that the fluid collection was a seroma - clear yellow fluid drained with no bacterial growth. She is having about 10 mL  a day output for several days. Plan for drain removal.

## 2025-07-15 ENCOUNTER — OFFICE VISIT (OUTPATIENT)
Dept: OBGYN CLINIC | Age: 50
End: 2025-07-15

## 2025-07-15 VITALS
DIASTOLIC BLOOD PRESSURE: 68 MMHG | HEIGHT: 61 IN | BODY MASS INDEX: 38.71 KG/M2 | WEIGHT: 205 LBS | SYSTOLIC BLOOD PRESSURE: 122 MMHG

## 2025-07-15 DIAGNOSIS — Z09 POSTOP CHECK: Primary | ICD-10-CM

## 2025-07-15 PROCEDURE — 99024 POSTOP FOLLOW-UP VISIT: CPT | Performed by: OBSTETRICS & GYNECOLOGY

## 2025-07-15 NOTE — PROGRESS NOTES
CC:  Postop follow-up      HPI:  Trav presents for postop visit from -[facility]/ which was complicated by severe adhesive disease.     Procedure:   Robotic Assisted Total Laparoscopic Hysterectomy less than 250 grams WITH Bilateral salpingectomy  Lysis of adhesions (x 1.5 hours) of both omental and bladder adhesions  Cystoscopy      Findings:    -On entry multiple omentum to anterior abdominal wall adhesions  -Omental adhesion to left uterine body  -Severe bladder adhesions of bladder up to mid uterine body. Adhesions very dense with loss of all normal anatomical planes.   - Due to these adhesions, bilateral round ligaments were not visualized until > 45 minutes of tedious dissection   - Normal bilateral ovaries  - Intact bladder with + bilateral ureteral jets      Due to severe adhesions, a second surgeon, Dr. Boston, was required. Having a second surgeon as assistant allowed procedure to be done safely and was key in avoiding bladder injury      Patient doing well postop. She was admitted 7/04 - 7/07 with RLQ fluid collection/fever initially thought to by diverticulitis. We were not consulted during her admission. It appears surgery did not think it was diverticulitis. She underwent IR drainage of SEROMA WITHOUT ABSCESS. Continued on antibiotics empirically. Overall feeling great now. Light pink spotting only.     PE:  /68   Ht 1.549 m (5' 1\")   Wt 93 kg (205 lb)   LMP 07/28/2024   BMI 38.73 kg/m²       Constitutional: She appears well-developed and well-nourished. No distress.    HENT:    Head: Normocephalic and atraumatic.    Cardiovascular: RRR  Pulmonary/Chest: CTAB  Abd: S/NTTP/ND, BS normoactive, Incision c/d/i , no erythema/induration  Ext: No c/c/e    A/P:  Stable Post op condition    Lifting restrictions:  <10-15lbs for 6-8wks  No intercourse until 6-8wks postop  No driving on narcotics   RTO 4wks to evaluate vaginal cuff     Unclear source of postop fever. Since fluid was seroma this does

## 2025-07-24 ENCOUNTER — HOSPITAL ENCOUNTER (OUTPATIENT)
Dept: MAMMOGRAPHY | Age: 50
Discharge: HOME | End: 2025-07-27
Payer: COMMERCIAL

## 2025-07-24 DIAGNOSIS — Z12.31 ENCOUNTER FOR SCREENING MAMMOGRAM FOR MALIGNANT NEOPLASM OF BREAST: ICD-10-CM

## 2025-07-24 PROCEDURE — 77063 BREAST TOMOSYNTHESIS BI: CPT

## 2025-08-15 ENCOUNTER — OFFICE VISIT (OUTPATIENT)
Dept: OBGYN CLINIC | Age: 50
End: 2025-08-15

## 2025-08-15 VITALS
SYSTOLIC BLOOD PRESSURE: 122 MMHG | DIASTOLIC BLOOD PRESSURE: 68 MMHG | WEIGHT: 205 LBS | HEIGHT: 61 IN | BODY MASS INDEX: 38.71 KG/M2

## 2025-08-15 DIAGNOSIS — Z09 POSTOP CHECK: Primary | ICD-10-CM

## 2025-08-15 PROCEDURE — 99024 POSTOP FOLLOW-UP VISIT: CPT | Performed by: OBSTETRICS & GYNECOLOGY

## (undated) DEVICE — ROBOTIC DRAPE WITH LEGGINGS: Brand: CONVERTORS

## (undated) DEVICE — ARM DRAPE

## (undated) DEVICE — GLOVE SURG SZ 65 THK91MIL LTX FREE SYN POLYISOPRENE

## (undated) DEVICE — SYRINGE MED 50ML LUERLOCK TIP

## (undated) DEVICE — LIQUIBAND RAPID ADHESIVE 36/CS 0.8ML: Brand: MEDLINE

## (undated) DEVICE — APPLICATOR MEDICATED 26 CC SOLUTION HI LT ORNG CHLORAPREP

## (undated) DEVICE — SKIN PREP TRAY 4 COMPARTM TRAY: Brand: MEDLINE INDUSTRIES, INC.

## (undated) DEVICE — COLUMN DRAPE

## (undated) DEVICE — SUTURE V-LOC 180 SZ 0 L9IN ABSRB GRN GS-21 L37MM 1/2 CIR VLOCL0346

## (undated) DEVICE — CYSTO/BLADDER IRRIGATION SET, REGULATING CLAMP

## (undated) DEVICE — HANDPIECE ABLAT DIA6MM ENDOMET IMPED CTRL DEV DISP NOVASURE

## (undated) DEVICE — GLOVE ORANGE PI 7   MSG9070

## (undated) DEVICE — MARKER SURG SKIN UTIL BLK REG TIP NONSMEARING W/ 6IN RUL

## (undated) DEVICE — SOLUTION IRRIG 3000ML 0.9% SOD CHL USP UROMATIC PLAS CONT

## (undated) DEVICE — DRAPE,UNDERBUTTOCKS,PCH,STERILE: Brand: MEDLINE

## (undated) DEVICE — TUBING, SUCTION, 1/4" X 12', STRAIGHT: Brand: MEDLINE

## (undated) DEVICE — PUMP SUC IRR TBNG L10FT W/ HNDPC ASSEMB STRYKEFLOW 2

## (undated) DEVICE — MANIPULATOR UTER 3.5CM ULTEM PLAS CUP DELINEATOR

## (undated) DEVICE — INTENDED FOR TISSUE SEPARATION, AND OTHER PROCEDURES THAT REQUIRE A SHARP SURGICAL BLADE TO PUNCTURE OR CUT.: Brand: BARD-PARKER ® STAINLESS STEEL BLADES

## (undated) DEVICE — AIRSEAL 8 MM ACCESS PORT AND LOW PROFILE OBTURATOR WITH BLADELESS OPTICAL TIP, 120 MM LENGTH: Brand: AIRSEAL

## (undated) DEVICE — SYRINGE MED 10ML LUERLOCK TIP W/O SFTY DISP

## (undated) DEVICE — OCCLUDER UTER DISP COLPO-PNEUMO

## (undated) DEVICE — SYSTEM POS SZ 36 X 20 X 1 IN INTEGR ADJ ARM PROTECTOR LIFT

## (undated) DEVICE — SEALER ENDOSCP L37CM NANO COAT BLNT TIP LAP DIV

## (undated) DEVICE — BASIC SINGLE BASIN 2-LF: Brand: MEDLINE INDUSTRIES, INC.

## (undated) DEVICE — VESSEL SEALER EXTEND: Brand: ENDOWRIST

## (undated) DEVICE — 1LYRTR 16FR10ML 100%SILI SNAP: Brand: MEDLINE INDUSTRIES, INC.

## (undated) DEVICE — BLADELESS OBTURATOR: Brand: WECK VISTA

## (undated) DEVICE — TIP COVER ACCESSORY

## (undated) DEVICE — CARDINAL HEALTH FLEXIBLE LIGHT HANDLE COVER: Brand: CARDINAL HEALTH

## (undated) DEVICE — CANISTER, RIGID, 2000CC: Brand: MEDLINE INDUSTRIES, INC.

## (undated) DEVICE — SEAL

## (undated) DEVICE — SUTURE MONOCRYL SZ 4-0 L27IN ABSRB UD L19MM PS-2 1/2 CIR PRIM Y426H

## (undated) DEVICE — ROBOTIC HYSTERECTOMY: Brand: MEDLINE INDUSTRIES, INC.

## (undated) DEVICE — MINOR LITHOTOMY PACK: Brand: MEDLINE INDUSTRIES, INC.

## (undated) DEVICE — CONTROL SYRINGE LUER-LOCK TIP: Brand: MONOJECT

## (undated) DEVICE — BASIC SINGLE BASIN-LF: Brand: MEDLINE INDUSTRIES, INC.